# Patient Record
Sex: FEMALE | Race: WHITE | Employment: OTHER | URBAN - METROPOLITAN AREA
[De-identification: names, ages, dates, MRNs, and addresses within clinical notes are randomized per-mention and may not be internally consistent; named-entity substitution may affect disease eponyms.]

---

## 2018-10-17 ENCOUNTER — APPOINTMENT (OUTPATIENT)
Dept: GENERAL RADIOLOGY | Age: 83
DRG: 871 | End: 2018-10-17
Attending: EMERGENCY MEDICINE
Payer: MEDICARE

## 2018-10-17 ENCOUNTER — HOSPITAL ENCOUNTER (INPATIENT)
Age: 83
LOS: 3 days | Discharge: HOSPICE | DRG: 871 | End: 2018-10-20
Attending: EMERGENCY MEDICINE | Admitting: HOSPITALIST
Payer: MEDICARE

## 2018-10-17 DIAGNOSIS — N30.00 ACUTE CYSTITIS WITHOUT HEMATURIA: Primary | ICD-10-CM

## 2018-10-17 DIAGNOSIS — R65.20 SEVERE SEPSIS (HCC): ICD-10-CM

## 2018-10-17 DIAGNOSIS — G30.1 LATE ONSET ALZHEIMER'S DISEASE WITH BEHAVIORAL DISTURBANCE (HCC): ICD-10-CM

## 2018-10-17 DIAGNOSIS — A41.9 SEVERE SEPSIS (HCC): ICD-10-CM

## 2018-10-17 DIAGNOSIS — F02.818 LATE ONSET ALZHEIMER'S DISEASE WITH BEHAVIORAL DISTURBANCE (HCC): ICD-10-CM

## 2018-10-17 PROBLEM — J18.9 PNA (PNEUMONIA): Status: ACTIVE | Noted: 2018-10-17

## 2018-10-17 PROBLEM — G30.9 ALZHEIMER'S DEMENTIA (HCC): Status: ACTIVE | Noted: 2018-10-17

## 2018-10-17 PROBLEM — F02.80 ALZHEIMER'S DEMENTIA (HCC): Status: ACTIVE | Noted: 2018-10-17

## 2018-10-17 PROBLEM — F41.8 SITUATIONAL ANXIETY: Status: ACTIVE | Noted: 2018-10-17

## 2018-10-17 PROBLEM — N39.0 UTI (URINARY TRACT INFECTION): Status: ACTIVE | Noted: 2018-10-17

## 2018-10-17 PROBLEM — E78.00 HYPERCHOLESTEREMIA: Status: ACTIVE | Noted: 2018-10-17

## 2018-10-17 PROBLEM — I10 HTN (HYPERTENSION): Status: ACTIVE | Noted: 2018-10-17

## 2018-10-17 PROBLEM — E87.6 HYPOKALEMIA: Status: ACTIVE | Noted: 2018-10-17

## 2018-10-17 LAB
ALBUMIN SERPL-MCNC: 3.3 G/DL (ref 3.4–5)
ALBUMIN/GLOB SERPL: 0.7 {RATIO} (ref 0.8–1.7)
ALP SERPL-CCNC: 91 U/L (ref 45–117)
ALT SERPL-CCNC: 34 U/L (ref 13–56)
ANION GAP SERPL CALC-SCNC: 12 MMOL/L (ref 3–18)
APPEARANCE UR: ABNORMAL
AST SERPL-CCNC: 20 U/L (ref 15–37)
BACTERIA URNS QL MICRO: ABNORMAL /HPF
BASOPHILS # BLD: 0 K/UL (ref 0–0.1)
BASOPHILS NFR BLD: 0 % (ref 0–2)
BILIRUB SERPL-MCNC: 2.2 MG/DL (ref 0.2–1)
BILIRUB UR QL: NEGATIVE
BUN SERPL-MCNC: 18 MG/DL (ref 7–18)
BUN/CREAT SERPL: 17 (ref 12–20)
CALCIUM SERPL-MCNC: 9.7 MG/DL (ref 8.5–10.1)
CHLORIDE SERPL-SCNC: 101 MMOL/L (ref 100–108)
CO2 SERPL-SCNC: 26 MMOL/L (ref 21–32)
COLOR UR: ABNORMAL
CREAT SERPL-MCNC: 1.07 MG/DL (ref 0.6–1.3)
DIFFERENTIAL METHOD BLD: ABNORMAL
EOSINOPHIL # BLD: 0 K/UL (ref 0–0.4)
EOSINOPHIL NFR BLD: 0 % (ref 0–5)
EPITH CASTS URNS QL MICRO: ABNORMAL /LPF (ref 0–5)
ERYTHROCYTE [DISTWIDTH] IN BLOOD BY AUTOMATED COUNT: 13.3 % (ref 11.6–14.5)
GLOBULIN SER CALC-MCNC: 4.5 G/DL (ref 2–4)
GLUCOSE SERPL-MCNC: 156 MG/DL (ref 74–99)
GLUCOSE UR STRIP.AUTO-MCNC: NEGATIVE MG/DL
HCT VFR BLD AUTO: 42 % (ref 35–45)
HGB BLD-MCNC: 14 G/DL (ref 12–16)
HGB UR QL STRIP: ABNORMAL
KETONES UR QL STRIP.AUTO: 15 MG/DL
LACTATE SERPL-SCNC: 1.3 MMOL/L (ref 0.4–2)
LACTATE SERPL-SCNC: 4 MMOL/L (ref 0.4–2)
LEUKOCYTE ESTERASE UR QL STRIP.AUTO: ABNORMAL
LYMPHOCYTES # BLD: 1 K/UL (ref 0.9–3.6)
LYMPHOCYTES NFR BLD: 7 % (ref 21–52)
MCH RBC QN AUTO: 28.7 PG (ref 24–34)
MCHC RBC AUTO-ENTMCNC: 33.3 G/DL (ref 31–37)
MCV RBC AUTO: 86.1 FL (ref 74–97)
MONOCYTES # BLD: 1.2 K/UL (ref 0.05–1.2)
MONOCYTES NFR BLD: 8 % (ref 3–10)
MUCOUS THREADS URNS QL MICRO: ABNORMAL /LPF
NEUTS SEG # BLD: 13.6 K/UL (ref 1.8–8)
NEUTS SEG NFR BLD: 85 % (ref 40–73)
NITRITE UR QL STRIP.AUTO: POSITIVE
PH UR STRIP: 5.5 [PH] (ref 5–8)
PLATELET # BLD AUTO: 151 K/UL (ref 135–420)
PMV BLD AUTO: 10.4 FL (ref 9.2–11.8)
POTASSIUM SERPL-SCNC: 3.4 MMOL/L (ref 3.5–5.5)
PROT SERPL-MCNC: 7.8 G/DL (ref 6.4–8.2)
PROT UR STRIP-MCNC: 30 MG/DL
RBC # BLD AUTO: 4.88 M/UL (ref 4.2–5.3)
RBC #/AREA URNS HPF: ABNORMAL /HPF (ref 0–5)
SODIUM SERPL-SCNC: 139 MMOL/L (ref 136–145)
SP GR UR REFRACTOMETRY: 1.03 (ref 1–1.03)
UROBILINOGEN UR QL STRIP.AUTO: 1 EU/DL (ref 0.2–1)
WBC # BLD AUTO: 15.8 K/UL (ref 4.6–13.2)
WBC URNS QL MICRO: ABNORMAL /HPF (ref 0–5)

## 2018-10-17 PROCEDURE — 85025 COMPLETE CBC W/AUTO DIFF WBC: CPT | Performed by: EMERGENCY MEDICINE

## 2018-10-17 PROCEDURE — 83605 ASSAY OF LACTIC ACID: CPT | Performed by: HOSPITALIST

## 2018-10-17 PROCEDURE — 92526 ORAL FUNCTION THERAPY: CPT

## 2018-10-17 PROCEDURE — 71045 X-RAY EXAM CHEST 1 VIEW: CPT

## 2018-10-17 PROCEDURE — 81001 URINALYSIS AUTO W/SCOPE: CPT | Performed by: EMERGENCY MEDICINE

## 2018-10-17 PROCEDURE — 51701 INSERT BLADDER CATHETER: CPT

## 2018-10-17 PROCEDURE — 87040 BLOOD CULTURE FOR BACTERIA: CPT | Performed by: EMERGENCY MEDICINE

## 2018-10-17 PROCEDURE — 77030011943

## 2018-10-17 PROCEDURE — 74011250637 HC RX REV CODE- 250/637: Performed by: EMERGENCY MEDICINE

## 2018-10-17 PROCEDURE — 87186 SC STD MICRODIL/AGAR DIL: CPT | Performed by: EMERGENCY MEDICINE

## 2018-10-17 PROCEDURE — 84132 ASSAY OF SERUM POTASSIUM: CPT | Performed by: EMERGENCY MEDICINE

## 2018-10-17 PROCEDURE — 74011250637 HC RX REV CODE- 250/637: Performed by: HOSPITALIST

## 2018-10-17 PROCEDURE — 83605 ASSAY OF LACTIC ACID: CPT | Performed by: EMERGENCY MEDICINE

## 2018-10-17 PROCEDURE — 74011250636 HC RX REV CODE- 250/636: Performed by: EMERGENCY MEDICINE

## 2018-10-17 PROCEDURE — 94762 N-INVAS EAR/PLS OXIMTRY CONT: CPT

## 2018-10-17 PROCEDURE — 96365 THER/PROPH/DIAG IV INF INIT: CPT

## 2018-10-17 PROCEDURE — 96375 TX/PRO/DX INJ NEW DRUG ADDON: CPT

## 2018-10-17 PROCEDURE — 99285 EMERGENCY DEPT VISIT HI MDM: CPT

## 2018-10-17 PROCEDURE — 83735 ASSAY OF MAGNESIUM: CPT | Performed by: HOSPITALIST

## 2018-10-17 PROCEDURE — 74011000250 HC RX REV CODE- 250: Performed by: EMERGENCY MEDICINE

## 2018-10-17 PROCEDURE — 87077 CULTURE AEROBIC IDENTIFY: CPT | Performed by: EMERGENCY MEDICINE

## 2018-10-17 PROCEDURE — 92610 EVALUATE SWALLOWING FUNCTION: CPT

## 2018-10-17 PROCEDURE — 87086 URINE CULTURE/COLONY COUNT: CPT | Performed by: EMERGENCY MEDICINE

## 2018-10-17 PROCEDURE — 36415 COLL VENOUS BLD VENIPUNCTURE: CPT | Performed by: HOSPITALIST

## 2018-10-17 PROCEDURE — 65610000006 HC RM INTENSIVE CARE

## 2018-10-17 PROCEDURE — 74011250636 HC RX REV CODE- 250/636: Performed by: HOSPITALIST

## 2018-10-17 PROCEDURE — 93005 ELECTROCARDIOGRAM TRACING: CPT

## 2018-10-17 RX ORDER — DOCUSATE SODIUM 100 MG/1
100 CAPSULE, LIQUID FILLED ORAL 2 TIMES DAILY
Status: DISCONTINUED | OUTPATIENT
Start: 2018-10-17 | End: 2018-10-17

## 2018-10-17 RX ORDER — ONDANSETRON 2 MG/ML
4 INJECTION INTRAMUSCULAR; INTRAVENOUS
Status: DISCONTINUED | OUTPATIENT
Start: 2018-10-17 | End: 2018-10-20 | Stop reason: HOSPADM

## 2018-10-17 RX ORDER — POTASSIUM CHLORIDE 20MEQ/15ML
40 LIQUID (ML) ORAL
Status: COMPLETED | OUTPATIENT
Start: 2018-10-17 | End: 2018-10-17

## 2018-10-17 RX ORDER — LEVOFLOXACIN 5 MG/ML
750 INJECTION, SOLUTION INTRAVENOUS
Status: DISCONTINUED | OUTPATIENT
Start: 2018-10-19 | End: 2018-10-18

## 2018-10-17 RX ORDER — ACETAMINOPHEN 325 MG/1
650 TABLET ORAL
Status: DISCONTINUED | OUTPATIENT
Start: 2018-10-17 | End: 2018-10-20 | Stop reason: HOSPADM

## 2018-10-17 RX ORDER — SAME BUTANEDISULFONATE/BETAINE 400-600 MG
500 POWDER IN PACKET (EA) ORAL 2 TIMES DAILY
Status: DISCONTINUED | OUTPATIENT
Start: 2018-10-17 | End: 2018-10-20 | Stop reason: HOSPADM

## 2018-10-17 RX ORDER — ACETAMINOPHEN 500 MG
1000 TABLET ORAL ONCE
Status: COMPLETED | OUTPATIENT
Start: 2018-10-17 | End: 2018-10-17

## 2018-10-17 RX ORDER — NALOXONE HYDROCHLORIDE 0.4 MG/ML
0.4 INJECTION, SOLUTION INTRAMUSCULAR; INTRAVENOUS; SUBCUTANEOUS AS NEEDED
Status: DISCONTINUED | OUTPATIENT
Start: 2018-10-17 | End: 2018-10-20 | Stop reason: HOSPADM

## 2018-10-17 RX ORDER — SODIUM CHLORIDE 9 MG/ML
50 INJECTION, SOLUTION INTRAVENOUS CONTINUOUS
Status: DISPENSED | OUTPATIENT
Start: 2018-10-17 | End: 2018-10-19

## 2018-10-17 RX ORDER — LORAZEPAM 0.5 MG/1
0.5 TABLET ORAL
COMMUNITY
End: 2018-10-20

## 2018-10-17 RX ORDER — LEVOFLOXACIN 5 MG/ML
750 INJECTION, SOLUTION INTRAVENOUS EVERY 24 HOURS
Status: DISCONTINUED | OUTPATIENT
Start: 2018-10-17 | End: 2018-10-17

## 2018-10-17 RX ORDER — HEPARIN SODIUM 5000 [USP'U]/ML
5000 INJECTION, SOLUTION INTRAVENOUS; SUBCUTANEOUS EVERY 8 HOURS
Status: DISCONTINUED | OUTPATIENT
Start: 2018-10-17 | End: 2018-10-19

## 2018-10-17 RX ORDER — VANCOMYCIN HYDROCHLORIDE
1250 EVERY 24 HOURS
Status: DISCONTINUED | OUTPATIENT
Start: 2018-10-17 | End: 2018-10-19

## 2018-10-17 RX ORDER — ACETAMINOPHEN 325 MG/1
650 TABLET ORAL
COMMUNITY

## 2018-10-17 RX ORDER — HYDRALAZINE HYDROCHLORIDE 20 MG/ML
10 INJECTION INTRAMUSCULAR; INTRAVENOUS
Status: DISCONTINUED | OUTPATIENT
Start: 2018-10-17 | End: 2018-10-20 | Stop reason: HOSPADM

## 2018-10-17 RX ORDER — SODIUM CHLORIDE 0.9 % (FLUSH) 0.9 %
5-10 SYRINGE (ML) INJECTION AS NEEDED
Status: DISCONTINUED | OUTPATIENT
Start: 2018-10-17 | End: 2018-10-20 | Stop reason: HOSPADM

## 2018-10-17 RX ORDER — DIPHENHYDRAMINE HYDROCHLORIDE 50 MG/ML
12.5 INJECTION, SOLUTION INTRAMUSCULAR; INTRAVENOUS
Status: DISCONTINUED | OUTPATIENT
Start: 2018-10-17 | End: 2018-10-20 | Stop reason: HOSPADM

## 2018-10-17 RX ADMIN — ACETAMINOPHEN 1000 MG: 500 TABLET, FILM COATED ORAL at 13:18

## 2018-10-17 RX ADMIN — SODIUM CHLORIDE 641 ML: 900 INJECTION, SOLUTION INTRAVENOUS at 13:54

## 2018-10-17 RX ADMIN — CEFEPIME HYDROCHLORIDE 2 G: 2 INJECTION, POWDER, FOR SOLUTION INTRAVENOUS at 13:19

## 2018-10-17 RX ADMIN — POTASSIUM CHLORIDE 40 MEQ: 20 SOLUTION ORAL at 19:14

## 2018-10-17 RX ADMIN — SODIUM CHLORIDE 500 ML: 900 INJECTION, SOLUTION INTRAVENOUS at 15:49

## 2018-10-17 RX ADMIN — SODIUM CHLORIDE 1000 ML: 900 INJECTION, SOLUTION INTRAVENOUS at 13:54

## 2018-10-17 RX ADMIN — VANCOMYCIN HYDROCHLORIDE 1250 MG: 10 INJECTION, POWDER, LYOPHILIZED, FOR SOLUTION INTRAVENOUS at 16:18

## 2018-10-17 RX ADMIN — HYDRALAZINE HYDROCHLORIDE 10 MG: 20 INJECTION INTRAMUSCULAR; INTRAVENOUS at 23:11

## 2018-10-17 RX ADMIN — SODIUM CHLORIDE 125 ML/HR: 900 INJECTION, SOLUTION INTRAVENOUS at 15:48

## 2018-10-17 RX ADMIN — LEVOFLOXACIN 750 MG: 5 INJECTION, SOLUTION INTRAVENOUS at 13:26

## 2018-10-17 RX ADMIN — SODIUM CHLORIDE 125 ML/HR: 900 INJECTION, SOLUTION INTRAVENOUS at 23:07

## 2018-10-17 RX ADMIN — HEPARIN SODIUM 5000 UNITS: 5000 INJECTION INTRAVENOUS; SUBCUTANEOUS at 17:57

## 2018-10-17 RX ADMIN — HEPARIN SODIUM 5000 UNITS: 5000 INJECTION INTRAVENOUS; SUBCUTANEOUS at 23:11

## 2018-10-17 NOTE — PROGRESS NOTES
Vancomycin - Pharmacy to Dose Consult provided for this 81y.o. year old ,female for indication of Pneumonia ( HAP ). Therapy day 1 Wt Readings from Last 1 Encounters:  
10/17/18 73.9 kg (163 lb) Ht Readings from Last 1 Encounters:  
10/17/18 162.6 cm (64\") Dosing Weight:  73.9 kg Additional Antibiotics:   Cefepime 2 gm IV q8hrs, Levofloxacin 750 mg IV q24hrs. Date:  10/17/18 Lab Results Component Value Date/Time Creatinine 1.07 10/17/2018 12:50 PM  
 
creatinine clearance:  39.9 ml/min 
 
white blood cell count:  15.8 Will dose Vancomycin 1250 mg IV q24hrs. Vancomycin trough level after 3-4 doses infused. Dose calculated to approximate a therapeutic trough of 15-20 mcg/mL. Pharmacy to follow daily and will make changes to dose and/or frequency based on clinical status. Additional recommendations regarding antibiotic therapy: Will renally adjust Cefepime to 2 gm IV q24hrs and 
 
Levofloxacin to 750 mg IV q48hrs per renal dosing protocol. [id number] [facility], [facility]

## 2018-10-17 NOTE — PROGRESS NOTES
Admission Medication Reconciliation has been performed on this ED patient consisting of interview of the patient regarding their PTA Home Medication List, Allergies and PMH as well as obtaining outpatient pharmacy information. Interviewed patient's family who were a good historians as pt has advanced dementia. Patient did not provide a written list of home medications. Medications are managed by: daughter Patient's outpatient pharmacy is [facility] Smoking status is: denies Alcohol use: wine a few times a year Illicit drug use: denies Patient ABX use within the past 30 days = an ABX for UTI at the end of July Has patient received any antineoplastics in the past 30 days? na Has patient received any radiation treatments in the past 45 days? na 
 
PAML was not marked complete and did  require modification. Admission orders have not already been written. Medication Compliance Issues and/or Medication Concerns:  
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 Family states they are unaware of any pain meds she did tolerate at any previous hospitalization. [facility] reports \"itching all over\" as rxn to codeine. Family states in July her PCP took her off all her meds (HTN and cholesterol)  and stopped her namenda almost 2 yrs ago as it wasn't helping her. Updated PAML w/ info provided by family: 
Prior to Admission Medications Prescriptions Last Dose Informant Patient Reported? Taking? LORazepam (ATIVAN) 0.5 mg tablet 8/1/2018  Yes Yes Sig: Take 0.5 mg by mouth daily as needed for Anxiety (situational anxiety). acetaminophen (TYLENOL) 325 mg tablet 10/16/2018 at Unknown time  Yes Yes Sig: Take 650 mg by mouth every six (6) hours as needed for Pain. cranberry extract 450 mg tab tablet 10/16/2018 at Unknown time  Yes Yes Sig: Take 450 mg by mouth as needed (uti sx). Facility-Administered Medications: None  
 
         
 
 
Luis Beavers [facility] Clinical Pharmacist 
[phone]

## 2018-10-17 NOTE — PROGRESS NOTES
Problem: Dysphagia (Adult) Goal: *Acute Goals and Plan of Care (Insert Text) Recommendations: 
Diet: Regular/thin Meds: Per patient preference Aspiration Precautions Oral Care TID Other: MBSS next day Goals:  Patient will: 1. Tolerate PO trials with 0 s/s overt distress in 4/5 trials 2. Utilize compensatory swallow strategies/maneuvers (decrease bite/sip, size/rate, alt. liq/sol) with min cues in 4/5 trials 3. Perform oral-motor/laryngeal exercises to increase oropharyngeal swallow function with min cues 4. Complete an objective swallow study (i.e., MBSS) to assess swallow integrity, r/o aspiration, and determine of safest LRD, min A Outcome: Progressing Towards Goal 
 
Speech LAnguage Pathology bedside swallow  
evaluation & TREATMENT Patient: Addy Westbrook (81 y.o. female) Date: 10/17/2018 Primary Diagnosis: Sepsis ([facility] [address] Precautions: Aspiration PLOF: Living with family ASSESSMENT : 
Based on the objective data described below, the patient presents with minimal oropharyngeal dysphagia in the setting of dementia and PNA. A&O to self and location. Son at bedside provided pt history. Endorses regular diet at baseline. Pt observed to have wet, weak cough prior; did not appear directly correlated to PO trials. Accepted self-fed thin liquid +/- straw via small sips, puree and regular solid trials. Pt exhibited adequate bolus cohesion, manipulation and A-P transit. Further exhibited adequate swallow timing/reflex and hyolaryngeal excursion. Able to manipulate and clear with 0 clinical s/s aspiration. Pt safe for regular solid, thin liquid diet with aspiration precautions. MBSS next day to rule out silent aspiration. Patient must be supervised for all PO. Patient and son agreeable to recommendations.  D/w Dr. Mensah Shows.  
 
Skilled therapy initiated; Educated pt and son on aspiration precautions and importance of compensatory swallow techniques to decrease aspiration risk (decrease rate of intake & sip/bite size, upright @HOB for all po intake and ~30 minutes after po); son verbalized comprehension, patient requires reinforcement. Patient will benefit from skilled intervention to address the above impairments. Patients rehabilitation potential is considered to be Fair Factors which may influence rehabilitation potential include:  
[]            None noted [x]            Mental ability/status []            Medical condition []            Home/family situation and support systems [x]            Safety awareness 
[]            Pain tolerance/management []            Other: PLAN : 
Recommendations and Planned Interventions: 
Regular/thin, MBSS Frequency/Duration: Patient will be followed by speech-language pathology 1-2 times per day/4-7 days per week to address goals. Discharge Recommendations: To Be Determined SUBJECTIVE:  
Patient stated [employer]. OBJECTIVE:  
 
Past Medical History:  
Diagnosis Date  Alzheimer's dementia PCP stopped all meds 7/2018  
 HTN (hypertension) PCP stopped all meds 7/2018  Hypercholesteremia PCP stopped all meds 7/2018  Situational anxiety Past Surgical History:  
Procedure Laterality Date  HX HYSTERECTOMY Age 55 Prior Level of Function/Home Situation: Living with family Diet prior to admission: Regular/thin Current Diet:  Regular/thin Cognitive and Communication Status: 
Neurologic State: Alert Orientation Level: Oriented to person, Oriented to place, Disoriented to situation, Disoriented to time Cognition: Follows commands, Decreased attention/concentration, Memory loss Perception: Appears intact Perseveration: Perseverates during conversation Safety/Judgement: Decreased insight into deficits Oral Assessment: 
Oral Assessment Labial: No impairment Dentition: Natural;Intact Oral Hygiene: Good Lingual: No impairment Velum: No impairment Mandible: No impairment P.O. Trials: 
Patient Position: KBE 69 Vocal quality prior to P.O.: Wet Consistency Presented: Thin liquid; Solid;Puree How Presented: Successive swallows;Cup/sip; Self-fed/presented Bolus Acceptance: No impairment Bolus Formation/Control: No impairment Propulsion: No impairment Oral Residue: None Initiation of Swallow: No impairment Laryngeal Elevation: Functional 
Aspiration Signs/Symptoms: Infiltrate on chest xray;Weak cough Pharyngeal Phase Characteristics: Poor endurance Effective Modifications: Small sips and bites Cues for Modifications: Minimal 
  
Oral Phase Severity: Minimal 
Pharyngeal Phase Severity : Minimal 
GCODESwallowing:  Swallow Current Status CI= 1-19%  Swallow Goal Status CH= 0% The severity rating is based on the following outcomes: NICOLLE Noms Swallow Level 6 Clinical Judgement PAIN: 
Start of Eval/Tx: 0 End of Eval/Tx: 0 After treatment:  
[]            Patient left in no apparent distress sitting up in chair 
[x]            Patient left in no apparent distress in bed 
[x]            Call bell left within reach 
[]            Nursing notified 
[x]            Family present [x]            Caregiver present 
[]            Bed alarm activated COMMUNICATION/EDUCATION:  
[x]            Safe swallowing guidelines; compensatory techniques. [x]            Patient/family have participated as able in goal setting and plan of care. [x]            Patient/family agree to work toward stated goals and plan of care. []            Patient understands intent and goals of therapy; neutral about participation. [x]            Patient unable to participate in goal setting/plan of care; educ ongoing with interdisciplinary staff 
[]         Posted safety precautions in patient's room. Thank you for this referral. 
AURELIA Mccall Time Calculation: 20 mins Evaluation Time: 10 minutes Treatment Time: 10 minutes

## 2018-10-17 NOTE — CONSULTS
[facility]  Pulmonary, Critical Care, and Sleep Medicine    Name: Davonte Obrien MRN: [id number]   : 1935 Hospital: [facility] [place]   Date: 10/17/2018        Pulmonary Critical Care Initial Patient Consult    IMPRESSION:   · Active Problems:  ·   Sepsis ([address] (10/17/2018)  ·   ·   Alzheimer's dementia (10/17/2018)  ·     Overview: PCP stopped all meds 2018  ·   ·   HTN (hypertension) (10/17/2018)  ·     Overview: PCP stopped all meds 2018  ·   ·   Hypercholesteremia (10/17/2018)  ·     Overview: PCP stopped all meds 2018  ·   ·   Situational anxiety (10/17/2018)  ·   ·   Hypokalemia (10/17/2018)  ·   ·   PNA (pneumonia) (10/17/2018)  ·   · Large ovarian cyst   RECOMMENDATIONS:   Compensated respiratory status. Monitor for goal SPO2> 90%  Aspiration prevention bundle, head of the bed at 30' all times  Sputum culture if sample available  Continue bronchodilators, pulmonary hygiene care  Sepsis bundle per hospital protocol  Antibiotic choice: cefepime, Levofloxacin, Vancomycin  Cultures drawn and will be followed. Lactic acid - initial elevated and repeat Q6hrs till normalized. Monitor hemodynamics  Stress ulcer prophylaxis  DVT prophylaxis  AM labs. Diet per swallow evaluation  Palliative care consulted    Will defer respective systems problem management to primary and other consultant and follow patient in ICU with primary and other medical team  Further recommendations will be based on the patient's response to recommended treatment and results of the investigation ordered. Quality Care: PPI, DVT prophylaxis, HOB elevated, Infection control all reviewed and addressed. PAIN AND SEDATION: patient demented  · Nutrition: diet per swallow eval  · Prophylaxis: DVT and GI Prophylaxis reviewed.    · Restraints: none  · PT/OT eval and treat: as needed   · Lines/Tubes: lines PIV  ADVANCE DIRECTIVE: DNR/DNI  FAMILY DISCUSSION: spoke with son at bedside  Events and notes from last 25 hours reviewed. Care plan discussed with nursing      Subjective/History:   Ms. Estrlela Ruano has been seen and evaluated as Dr. Irineo Lyle requested now for pneumonia. The patient can not provide additional history due to dementia. Son provided information. Patient is a 81 y.o. female with PMHX of Alzheimer's disease, ovarian Cyst who was brought by son to the ED C/O decreased activity onset 2-3 days ago associated with fever, decreased appetite, and dark urine with odor. CXR done at [facility] showed PNA. Son denies cough, dysphagia, N/V, abd pain and chest pain. At the time of this evaluation patient Is awake, alert, follows some simple commands. Otherwise information is limited. Review of Systems:  Review of systems not obtained due to patient factors.     [x]The patient is unable to give any meaningful history or review of systems because the patient is:  []Intubated []Sedated   []Unresponsive [x]demented     [x]The patient is critically ill on      []Mechanical ventilation []Pressors   []BiPAP [x]Severe sepsis               Latest lactic acid:   Lactic acid   Date Value Ref Range Status   10/17/2018 1.3 0.4 - 2.0 MMOL/L Final   10/17/2018 4.0 (HH) 0.4 - 2.0 MMOL/L Final     Comment:     CALLED TO AND CORRECTLY REPEATED BY:  DR SAMSON ED ON 10/17/18 AT 1349 TO [facility]         Completed IVF Resuscitation (30ml/kg) - yes  IVF choice: 0.9NS    Suspected source/s of severe sepsis: pneumonia, UTI    Organ dysfunction: Yes    Antibiotics: vancomycin, Levaquin and cefepime    Completed physical exam: yes    Past Medical History:  Past Medical History:   Diagnosis Date    Alzheimer's dementia     PCP stopped all meds 7/2018    HTN (hypertension)     PCP stopped all meds 7/2018    Hypercholesteremia     PCP stopped all meds 7/2018    Situational anxiety         Past Surgical History:  Past Surgical History:   Procedure Laterality Date    HX HYSTERECTOMY      Age 55        Medications:  Prior to Admission medications Medication Sig Start Date End Date Taking? Authorizing Provider   LORazepam (ATIVAN) 0.5 mg tablet Take 0.5 mg by mouth daily as needed for Anxiety (situational anxiety). Yes Provider, Historical   cranberry extract 450 mg tab tablet Take 450 mg by mouth as needed (uti sx). Yes Provider, Historical   acetaminophen (TYLENOL) 325 mg tablet Take 650 mg by mouth every six (6) hours as needed for Pain. Yes Provider, Historical       Current Facility-Administered Medications   Medication Dose Route Frequency    0.9% sodium chloride infusion  125 mL/hr IntraVENous CONTINUOUS    heparin (porcine) injection 5,000 Units  5,000 Units SubCUTAneous Q8H    sodium chloride 0.9 % bolus infusion 500 mL  500 mL IntraVENous ONCE    Saccharomyces boulardii (FLORASTOR) capsule 500 mg  500 mg Oral BID    vancomycin (VANCOCIN) 1250 mg in  ml infusion  1,250 mg IntraVENous Q24H    Vancomycin - Pharmacokinetic Dosing  1 Each Other Rx Dosing/Monitoring    [START ON 10/19/2018] levoFLOXacin (LEVAQUIN) 750 mg in D5W IVPB  750 mg IntraVENous Q48H    [START ON 10/18/2018] cefepime (MAXIPIME) 2 g in sterile water (preservative free) 10 mL IV syringe  2 g IntraVENous Q24H       Allergy:  Allergies   Allergen Reactions    Codeine Unknown (comments)     \"made her deathly ill a very long long time ago\"        Social History:  Social History     Tobacco Use    Smoking status: Never Smoker    Smokeless tobacco: Never Used   Substance Use Topics    Alcohol use: No     Frequency: Never    Drug use: No        Family History:  Family History   Problem Relation Age of Onset    Breast Cancer Sister           Objective:   Vital Signs:    Blood pressure 140/59, pulse 84, temperature 98.5 °F (36.9 °C), resp. rate 26, height 5' 4\" (1.626 m), weight 73.9 kg (163 lb), SpO2 94 %. Body mass index is 27.98 kg/m².    O2 Device: Room air       Temp (24hrs), Av.4 °F (37.4 °C), Min:98.5 °F (36.9 °C), Max:101.1 °F (38.4 °C)       Patient Vitals for the past 8 hrs:   Temp Pulse Resp BP SpO2   10/17/18 1600  84 26 140/59 94 %   10/17/18 1554  86 22 157/74 96 %   10/17/18 1530  88 (!) 31 157/74    10/17/18 1527  88 28 (!) 123/97 97 %   10/17/18 1500  91 28 (!) 123/97    10/17/18 1439 98.5 °F (36.9 °C) 91 26 143/70 97 %   10/17/18 1430  89 25 143/70 97 %   10/17/18 1400  92 29 120/78    10/17/18 1330  96 (!) 31 155/65    10/17/18 1327  95 28 154/70 97 %   10/17/18 1308  93 (!) 32 154/70 95 %   10/17/18 1240 (!) 101.1 °F (38.4 °C) 97 29 (!) 143/102 97 %   10/17/18 1224 98.6 °F (37 °C) 99 16 146/74 97 %     Intake/Output:   Last shift:      No intake/output data recorded. Last 3 shifts: No intake/output data recorded. No intake or output data in the 24 hours ending 10/17/18 1616      Physical Exam:  General: awake, alert, demented, in no respiratory distress and acyanotic, cooperative, no distress, appears stated age, on room air  HEENT: PERRL, fundi benign, throat normal without erythema or exudate  Neck: No abnormally enlarged lymph nodes or thyroid, supple  Chest: normal  Lungs: moderate air entry, breathing normal , normal percussion bilaterally, rhonchi few R> L base, no tenderness/ rash  Heart: Regular rate and rhythm, S1S2 present or without murmur or extra heart sounds  Abdomen: protuberant, bowel sounds normoactive, tympanic, abdomen is soft without significant tenderness, masses, organomegaly or guarding, rigidity, rebound  Extremity: negative for edema, cyanosis, clubbing  Capillary refill: normal  Neuro: moves all extremities well, no involuntary movements, awake, follows some commands, dementia, exam limitation  Skin: Skin color, texture, turgor fair.  Skin dry, warm, non-diaphoretic    Data:     Recent Results (from the past 24 hour(s))   LACTIC ACID    Collection Time: 10/17/18 12:50 PM   Result Value Ref Range    Lactic acid 4.0 (HH) 0.4 - 2.0 MMOL/L   METABOLIC PANEL, COMPREHENSIVE    Collection Time: 10/17/18 12:50 PM Result Value Ref Range    Sodium 139 136 - 145 mmol/L    Potassium 3.4 (L) 3.5 - 5.5 mmol/L    Chloride 101 100 - 108 mmol/L    CO2 26 21 - 32 mmol/L    Anion gap 12 3.0 - 18 mmol/L    Glucose 156 (H) 74 - 99 mg/dL    BUN 18 7.0 - 18 MG/DL    Creatinine 1.07 0.6 - 1.3 MG/DL    BUN/Creatinine ratio 17 12 - 20      GFR est AA 60 (L) >60 ml/min/1.73m2    GFR est non-AA 49 (L) >60 ml/min/1.73m2    Calcium 9.7 8.5 - 10.1 MG/DL    Bilirubin, total 2.2 (H) 0.2 - 1.0 MG/DL    ALT (SGPT) 34 13 - 56 U/L    AST (SGOT) 20 15 - 37 U/L    Alk. phosphatase 91 45 - 117 U/L    Protein, total 7.8 6.4 - 8.2 g/dL    Albumin 3.3 (L) 3.4 - 5.0 g/dL    Globulin 4.5 (H) 2.0 - 4.0 g/dL    A-G Ratio 0.7 (L) 0.8 - 1.7     CBC WITH AUTOMATED DIFF    Collection Time: 10/17/18 12:50 PM   Result Value Ref Range    WBC 15.8 (H) 4.6 - 13.2 K/uL    RBC 4.88 4.20 - 5.30 M/uL    HGB 14.0 12.0 - 16.0 g/dL    HCT 42.0 35.0 - 45.0 %    MCV 86.1 74.0 - 97.0 FL    MCH 28.7 24.0 - 34.0 PG    MCHC 33.3 31.0 - 37.0 g/dL    RDW 13.3 11.6 - 14.5 %    PLATELET 682 798 - 477 K/uL    MPV 10.4 9.2 - 11.8 FL    NEUTROPHILS 85 (H) 40 - 73 %    LYMPHOCYTES 7 (L) 21 - 52 %    MONOCYTES 8 3 - 10 %    EOSINOPHILS 0 0 - 5 %    BASOPHILS 0 0 - 2 %    ABS. NEUTROPHILS 13.6 (H) 1.8 - 8.0 K/UL    ABS. LYMPHOCYTES 1.0 0.9 - 3.6 K/UL    ABS. MONOCYTES 1.2 0.05 - 1.2 K/UL    ABS. EOSINOPHILS 0.0 0.0 - 0.4 K/UL    ABS.  BASOPHILS 0.0 0.0 - 0.1 K/UL    DF AUTOMATED     URINALYSIS W/ RFLX MICROSCOPIC    Collection Time: 10/17/18  1:08 PM   Result Value Ref Range    Color DARK YELLOW      Appearance CLOUDY      Specific gravity 1.026 1.005 - 1.030      pH (UA) 5.5 5.0 - 8.0      Protein 30 (A) NEG mg/dL    Glucose NEGATIVE  NEG mg/dL    Ketone 15 (A) NEG mg/dL    Bilirubin NEGATIVE  NEG      Blood LARGE (A) NEG      Urobilinogen 1.0 0.2 - 1.0 EU/dL    Nitrites POSITIVE (A) NEG      Leukocyte Esterase SMALL (A) NEG     URINE MICROSCOPIC ONLY    Collection Time: 10/17/18  1:08 PM Result Value Ref Range    WBC 2 to 5 0 - 5 /hpf    RBC 4 to 10 0 - 5 /hpf    Epithelial cells 2+ 0 - 5 /lpf    Bacteria 4+ (A) NEG /hpf    Mucus 1+ (A) NEG /lpf   LACTIC ACID    Collection Time: 10/17/18  3:15 PM   Result Value Ref Range    Lactic acid 1.3 0.4 - 2.0 MMOL/L           No results for input(s): FIO2I, IFO2, HCO3I, IHCO3, HCOPOC, PCO2I, PCOPOC, IPHI, PHI, PHPOC, PO2I, PO2POC in the last 72 hours. No lab exists for component: IPOC2    All Micro Results     Procedure Component Value Units Date/Time    Mary Marques, URINE [[id number]]     Order Status:  Sent Specimen:  Urine     LEGIONELLA PNEUMOPHILA AG, URINE [[id number]]     Order Status:  Sent Specimen:  Urine     INFLUENZA A & B AG (RAPID TEST) [[id number]]     Order Status:  Sent Specimen:  Nasopharyngeal     CULTURE, BLOOD [[id number]] Collected:  10/17/18 1250    Order Status:  Completed Specimen:  Blood Updated:  10/17/18 1318    CULTURE, BLOOD [[id number]] Collected:  10/17/18 1300    Order Status:  Completed Specimen:  Blood Updated:  10/17/18 1317    CULTURE, URINE [[id number]] Collected:  10/17/18 1308    Order Status:  Completed Specimen:  Cath Urine Updated:  10/17/18 1313          Telemetry: normal sinus rhythm      Imaging:  [x]I have personally reviewed the patients chest radiographs images and report     Results from Hospital Encounter encounter on 10/17/18   XR CHEST PORT    Narrative EXAM: XR CHEST PORT    CLINICAL INDICATION/HISTORY: meets SIRS criteria   >Additional: None    COMPARISON: None. >Reference exam: None. TECHNIQUE: Portable chest.    _______________    FINDINGS:    The patient is slightly rotated to the left. SUPPORT LINES AND TUBES: None. HEART AND MEDIASTINUM: The cardiac and mediastinal contours and pulmonary  vascularity are normal. Mild atheromatous calcifications noted in the aorta. LUNGS AND PLEURAL SPACES: Streaky right basilar opacity.  Questionable subtle  blunting of left costophrenic sulcus, partially obscured due to patient  rotation. The lungs are otherwise clear. BONY THORAX AND SOFT TISSUES: Mild general demineralization. Degenerative  changes in the [facility] joints bilaterally. No fractures. _______________      Impression IMPRESSION:    1. Patchy right basilar airspace disease, suspicious for pneumonia or possibly  aspiration. 2.  Possible trace left pleural effusion or atelectasis but suboptimally  assessed given patient rotation. No results found for this or any previous visit. [x]See my orders for details    My assessment, plan of care, findings, medications, side effects etc were discussed with:  [x]nursing []PT/OT    [x]respiratory therapy [x]Faviola Johnson   []family [x]Patient     [x]Total critical care time exclusive of procedures 50 minutes with complex decision making performed and > 50% time spent in face to face evaluation.     Aleksey Monsivais MD

## 2018-10-17 NOTE — ED NOTES
Last Filed Values: 
Temp: 98.5 °F (36.9 °C) (10/17/18 1439) Pulse (Heart Rate): 84 (10/17/18 1600) Resp Rate: 26 (10/17/18 1600) O2 Sat (%): 94 % (10/17/18 1600) BP: 140/59 (10/17/18 1600) MAP (Monitor): 79 (10/17/18 1600) MAP (Calculated): 102 (10/17/18 1554) Level of Consciousness: Alert (10/17/18 1554) Lab Results Component Value Date/Time WBC 15.8 (H) 10/17/2018 12:50 PM  
 Lactic acid 1.3 10/17/2018 03:15 PM  
 Lactic acid 4.0 (HH) 10/17/2018 12:50 PM  
 
 
Repeat LA:  Time Due: completed at 1515 Blood Cultures Drawn:  yes Fluid Resuscitation:  Total needed 1641, Status completed, amount 1641 All Antibiotics Started:  yes, Dose Due 1600, vancomycin (not delivered from pharmacy yet) VS x 2 post-fluid resuscitation:   yes Vasopressor Infusion:  no NA Provider Reassessment needed and notified:  no , Due NA Additional Interventions/Comments:  NA

## 2018-10-17 NOTE — ED NOTES
Per Luz Elena RN in ICU, she will not take report on this pt due to being unable to give Tele report on a transfer pt. Luz Elena reports, she is cannot take this pt until she gives report on her transfer pt to the tele unit.

## 2018-10-17 NOTE — ED PROVIDER NOTES
EMERGENCY DEPARTMENT HISTORY AND PHYSICAL EXAM 
 
Date: 10/17/2018 Patient Name: Mendez Zapata History of Presenting Illness No chief complaint on file. History Provided By: Patient Chief Complaint: decreased activity Duration: 2-3 days ago Timing:  Gradual and Worsening Location: generalized Quality: decreased Associated Symptoms: fever, decreased appetite, and dark urine with odor. Additional History (Context):  
12:38 PM 
Mendez Zapata is a 81 y.o. female with PMHX of Alzheimer's and Ovarian Cyst who presents to the emergency department C/O decreased activity onset 2-3 days ago. Associated sxs include fever, decreased appetite, and dark urine with odor. CXR done at [facility] showed PNA. Pt daughter suspects UTI with urine odor and confusion. Pt went to [facility] in July for possible bowel obstruction and pt was treated for UTI due to age. XR done then showed an ovarian cyst. Pt is able to repeat what others say, but does not answer questions appropriately as baseline. Allergy reported to Codeine. PSHx of hysterectomy. Pt is a non smoker, a non EtOH user, and a non recreational drug user. Pt caregiver and pt denies cough, abd pain, and chest pain. Hx and ROS limited due to pt AMS. PCP: Vlad Sim MD 
 
 
 
Past History Past Medical History: 
Past Medical History:  
Diagnosis Date  Alzheimer's dementia Past Surgical History: 
Past Surgical History:  
Procedure Laterality Date  HX HYSTERECTOMY Age 55 Family History: 
Family History Problem Relation Age of Onset  Breast Cancer Sister Social History: 
Social History Tobacco Use  Smoking status: Never Smoker  Smokeless tobacco: Never Used Substance Use Topics  Alcohol use: No  
  Frequency: Never  Drug use: No  
 
 
Allergies: Allergies Allergen Reactions  Codeine Unknown (comments) Review of Systems Review of Systems Constitutional: Positive for activity change (decreased), appetite change (decreased) and fever. Respiratory: Negative for cough. Cardiovascular: Negative for chest pain. Gastrointestinal: Negative for abdominal pain. Genitourinary:  
     (+) Urine odor and dark color Psychiatric/Behavioral: Positive for confusion. All other systems reviewed and are negative. Physical Exam  
 
Vitals:  
 10/17/18 1308 10/17/18 1327 10/17/18 1330 10/17/18 1400 BP: 154/70 154/70 155/65 120/78 Pulse: 93 95 96 92 Resp: (!) 32 28 (!) 31 29 Temp:      
SpO2: 95% 97% Weight:      
Height:      
 
Physical Exam  
Nursing note and vitals reviewed. Constitutional: Elderly appearing, mild acute distress Head: Normocephalic, Atraumatic Eyes: Pupils are equal, round, and reactive to light, EOMI Neck: Supple, non-tender Cardiovascular: Regular rate and rhythm, no murmurs, rubs, or gallops Chest: Normal work of breathing and chest excursion bilaterally Lungs: Clear to ausculation bilaterally Abdomen: Soft, non tender, non distended, normoactive bowel sounds Back: No evidence of trauma or deformity Extremities: No evidence of trauma or deformity, no LE edema Skin: Warm and dry, normal cap refill Neuro: Alert and appropriate. Baseline confusion, per family at bedside. Psychiatric: Normal mood and affect Diagnostic Study Results Labs - Recent Results (from the past 12 hour(s)) LACTIC ACID Collection Time: 10/17/18 12:50 PM  
Result Value Ref Range Lactic acid 4.0 (HH) 0.4 - 2.0 MMOL/L  
METABOLIC PANEL, COMPREHENSIVE Collection Time: 10/17/18 12:50 PM  
Result Value Ref Range Sodium 139 136 - 145 mmol/L Potassium 3.4 (L) 3.5 - 5.5 mmol/L Chloride 101 100 - 108 mmol/L  
 CO2 26 21 - 32 mmol/L Anion gap 12 3.0 - 18 mmol/L Glucose 156 (H) 74 - 99 mg/dL BUN 18 7.0 - 18 MG/DL  Creatinine 1.07 0.6 - 1.3 MG/DL  
 BUN/Creatinine ratio 17 12 - 20    
 GFR est AA 60 (L) >60 ml/min/1.73m2 GFR est non-AA 49 (L) >60 ml/min/1.73m2 Calcium 9.7 8.5 - 10.1 MG/DL Bilirubin, total 2.2 (H) 0.2 - 1.0 MG/DL  
 ALT (SGPT) 34 13 - 56 U/L  
 AST (SGOT) 20 15 - 37 U/L Alk. phosphatase 91 45 - 117 U/L Protein, total 7.8 6.4 - 8.2 g/dL Albumin 3.3 (L) 3.4 - 5.0 g/dL Globulin 4.5 (H) 2.0 - 4.0 g/dL A-G Ratio 0.7 (L) 0.8 - 1.7    
CBC WITH AUTOMATED DIFF Collection Time: 10/17/18 12:50 PM  
Result Value Ref Range WBC 15.8 (H) 4.6 - 13.2 K/uL  
 RBC 4.88 4.20 - 5.30 M/uL  
 HGB 14.0 12.0 - 16.0 g/dL HCT 42.0 35.0 - 45.0 % MCV 86.1 74.0 - 97.0 FL  
 MCH 28.7 24.0 - 34.0 PG  
 MCHC 33.3 31.0 - 37.0 g/dL  
 RDW 13.3 11.6 - 14.5 % PLATELET 780 977 - 422 K/uL MPV 10.4 9.2 - 11.8 FL  
 NEUTROPHILS 85 (H) 40 - 73 % LYMPHOCYTES 7 (L) 21 - 52 % MONOCYTES 8 3 - 10 % EOSINOPHILS 0 0 - 5 % BASOPHILS 0 0 - 2 %  
 ABS. NEUTROPHILS 13.6 (H) 1.8 - 8.0 K/UL  
 ABS. LYMPHOCYTES 1.0 0.9 - 3.6 K/UL  
 ABS. MONOCYTES 1.2 0.05 - 1.2 K/UL  
 ABS. EOSINOPHILS 0.0 0.0 - 0.4 K/UL  
 ABS. BASOPHILS 0.0 0.0 - 0.1 K/UL  
 DF AUTOMATED URINALYSIS W/ RFLX MICROSCOPIC Collection Time: 10/17/18  1:08 PM  
Result Value Ref Range Color DARK YELLOW Appearance CLOUDY Specific gravity 1.026 1.005 - 1.030    
 pH (UA) 5.5 5.0 - 8.0 Protein 30 (A) NEG mg/dL Glucose NEGATIVE  NEG mg/dL Ketone 15 (A) NEG mg/dL Bilirubin NEGATIVE  NEG Blood LARGE (A) NEG Urobilinogen 1.0 0.2 - 1.0 EU/dL Nitrites POSITIVE (A) NEG Leukocyte Esterase SMALL (A) NEG URINE MICROSCOPIC ONLY Collection Time: 10/17/18  1:08 PM  
Result Value Ref Range WBC 2 to 5 0 - 5 /hpf  
 RBC 4 to 10 0 - 5 /hpf Epithelial cells 2+ 0 - 5 /lpf Bacteria 4+ (A) NEG /hpf Mucus 1+ (A) NEG /lpf Radiologic Studies -  
 
CT Results  (Last 48 hours) None CXR Results  (Last 48 hours) 10/17/18 1346  XR CHEST PORT Final result Impression:  IMPRESSION:  
   
1. Patchy right basilar airspace disease, suspicious for pneumonia or possibly  
aspiration. 2.  Possible trace left pleural effusion or atelectasis but suboptimally  
assessed given patient rotation. Narrative:  EXAM: XR CHEST PORT  
   
CLINICAL INDICATION/HISTORY: meets SIRS criteria >Additional: None COMPARISON: None. >Reference exam: None. TECHNIQUE: Portable chest.  
   
_______________ FINDINGS:  
   
The patient is slightly rotated to the left. SUPPORT LINES AND TUBES: None. HEART AND MEDIASTINUM: The cardiac and mediastinal contours and pulmonary  
vascularity are normal. Mild atheromatous calcifications noted in the aorta. LUNGS AND PLEURAL SPACES: Streaky right basilar opacity. Questionable subtle  
blunting of left costophrenic sulcus, partially obscured due to patient  
rotation. The lungs are otherwise clear. BONY THORAX AND SOFT TISSUES: Mild general demineralization. Degenerative  
changes in the [facility] joints bilaterally. No fractures. _______________ Medications given in the ED- Medications  
sodium chloride (NS) flush 5-10 mL (not administered)  
cefepime (MAXIPIME) 2 g in sterile water (preservative free) 10 mL IV syringe (2 g IntraVENous Given 10/17/18 1319) levoFLOXacin (LEVAQUIN) 750 mg in D5W IVPB (750 mg IntraVENous New Bag 10/17/18 1326)  
acetaminophen (TYLENOL) tablet 1,000 mg (1,000 mg Oral Given 10/17/18 1318)  
sodium chloride 0.9 % bolus infusion 1,000 mL (1,000 mL IntraVENous New Bag 10/17/18 1354) Followed by  
sodium chloride 0.9 % bolus infusion 641 mL (641 mL IntraVENous New Bag 10/17/18 1354) Medical Decision Making I am the first provider for this patient. I reviewed the vital signs, available nursing notes, past medical history, past surgical history, family history and social history. Vital Signs-Reviewed the patient's vital signs. Pulse Oximetry Analysis - 97% on room air Cardiac Monitor: 
Rate: 87 bpm 
Rhythm: sinus rhythm EKG interpretation: (Preliminary) 12:48 PM  
NSR at 95 bpm. QRS duration at 110 ms. EKG read by Celsa Fine MD at 12:50 PM  
 
Records Reviewed: Nursing Notes and Old Medical Records Provider Notes (Medical Decision Making): 81year old female sent from Urgent care for sepsis. Hx and exam consistent with UTI and XR concerning for pneumonia. Sepsis protocol initiated. Lactate with evidence of severe sepsis. Will discuss with hospitalist for further inpatient management. Procedures: 
Procedures ED Course:  
12:38 PM Initial assessment performed. The patients presenting problems have been discussed, and they are in agreement with the care plan formulated and outlined with them. I have encouraged them to ask questions as they arise throughout their visit. 1:49 PM With regards to administration of fluid calculation, I am using ideal body weight due to age and obesity. SEPSIS ASSESSMENT NOTE:  
2:11 PM 
Seema Sher MD has seen and assessed the patient as follows: 
 
Capillary Refill:normal/brisk Cardiopulmonary Evaluation: 
 Lung Sounds: dull/diminished Cardiac Sounds: regular Peripheral Pulses: present Skin Exam: skin unremarkable, pink, warm Visit Vitals /65 Pulse 96 Temp (!) 101.1 °F (38.4 °C) Resp (!) 31 Ht 5' 4\" (1.626 m) Wt 73.9 kg (163 lb) SpO2 97% BMI 27.98 kg/m² Written by Danielle Sanches ED Scribe, as dictated by Celsa Fine MD  
 
2:21 PM Discussed patient's history, exam, and available diagnostics results with Patricia Pierre MD, internal medicine, who agree with admission to the ICU. 
 
2:33 PM Updated on plan for admission and son informed me that she is a DNR/DNI. Diagnosis and Disposition Critical Care Time:  
I have spent 43 minutes of critical care time involved in lab review, consultations with specialist, family decision-making, and documentation. During this entire length of time I was immediately available to the patient. Critical Care: The reason for providing this level of medical care for this critically ill patient was due a critical illness that impaired one or more vital organ systems such that there was a high probability of imminent or life threatening deterioration in the patients condition. This care involved high complexity decision making to assess, manipulate, and support vital system functions, to treat this degreee vital organ system failure and to prevent further life threatening deterioration of the patients condition. Core Measures: 
For Hospitalized Patients: 
 
1. Hospitalization Decision Time: The decision to hospitalize the patient was made by Lourdes Smith MD at 1:49 PM on 10/17/2018 2. Aspirin: Aspirin was not given because the patient did not present with a stroke at the time of their Emergency Department evaluation 2:21 PM  Patient is being admitted to the hospital by Geoffrey Conner MD. The results of their tests and reasons for their admission have been discussed with them and/or available family. They convey agreement and understanding for the need to be admitted and for their admission diagnosis. CONDITIONS ON ADMISSION: 
Sepsis is present at the time of admission. Deep Vein Thrombosis is not present at the time of admission. Thrombosis is not present at the time of admission. Urinary Tract Infection is present at the time of admission. Pneumonia is present at the time of admission. MRSA is not present at the time of admission. Wound infection is not present at the time of admission. Pressure Ulcer is not present at the time of admission. CLINICAL IMPRESSION: 
 
1. Acute cystitis without hematuria 2. Severe sepsis ([address] PLAN: 
1. ADMIT to ICU 
_______________________________ Attestations: This note is prepared by Greg Monterroso and Filiberto Chin, acting as Scribe for Jennifer Huffman MD. Jennifer Huffman MD:  The scribe's documentation has been prepared under my direction and personally reviewed by me in its entirety. I confirm that the note above accurately reflects all work, treatment, procedures, and medical decision making performed by me. 
_______________________________

## 2018-10-17 NOTE — PROGRESS NOTES
Physical Therapy Screening: 
Services are indicated and therapy order is required. An [facility] screening referral was triggered for physical therapy based on results obtained during the nursing admission assessment. The patients chart was reviewed and the patient is appropriate for a skilled therapy evaluation. Please order a consult for physical therapy if you are in agreement and would like an evaluation to be completed. Thank you.  
 
Pacheco Lord, PTA

## 2018-10-17 NOTE — ED NOTES
4th attempt to call report. Accepting RN unable to take report at this time. Chris Cruger from ICU states, [facility] accepting RN is currently in the middle of transferring another pt.

## 2018-10-17 NOTE — PROGRESS NOTES
Occupational Therapy Screening: 
Services are not indicated at this time. [facility] was triggered for occupational therapy based on results obtained during the nursing admission assessment. The patients chart was reviewed and the patient is not appropriate for a skilled therapy evaluation at this time. Patient admitted w/ UTI and possible ? PNA from [facility]. Appears to require significant ADL assist at baseline d/t cognition. Pt w/ advanced Alzheimers and will need medical management for cognition to return to baseline. Thank you.  
Almita Leung, OTR/L

## 2018-10-17 NOTE — ED TRIAGE NOTES
Patient taken to [facility] for possible [facility], patient family member told to take patient to ER for possbile PNA and abnormal labs, hx alzheimer's patient unable to provide hx

## 2018-10-17 NOTE — PROGRESS NOTES
1715: pt received to unit. Head to toe assessment completed. Pt son and daughter in law at bedside. Updated on pt condition. Pt is on room air, lung sounds diminished bilaterally. Alert, but oriented to person only. Bowel sounds hyperactive. Abdominal mass noted. Pt denies pain at this time. Afebrile and vital signs stable. Call light left in reach. Will continue to monitor closely. 1930: Bedside and Verbal shift change report given to AMANDA El RN (oncoming nurse) by JOEY Talamantes (offgoing nurse). Report included the following information SBAR, Kardex, Intake/Output, MAR and Cardiac Rhythm NSR.

## 2018-10-17 NOTE — H&P
History & Physical 
Patient: Michael Kelly MRN: [id number]  CSN: [id number] YOB: 1935  Age: 81 y.o. Sex: female DOA: 10/17/2018 Primary Care Provider:  Clive Ramírez MD 
 
 
Assessment/Plan Hospital Problems  Never Reviewed Codes Class Noted POA * (Principal) Severe sepsis ([address] ICD-10-CM: A41.9, R65.20 ICD-9-CM: 038.9, 995.92  10/17/2018 Alzheimer's dementia ICD-10-CM: G30.9, F02.80 ICD-9-CM: 331.0  10/17/2018 Unknown Overview Signed 10/17/2018  3:10 PM by Bipin Gibson MD  
  PCP stopped all meds 7/2018 HTN (hypertension) ICD-10-CM: I10 
ICD-9-CM: 401.9  10/17/2018 Unknown Overview Signed 10/17/2018  3:10 PM by Bipin Gibson MD  
  PCP stopped all meds 7/2018 Hypercholesteremia ICD-10-CM: E78.00 ICD-9-CM: 272.0  10/17/2018 Unknown Overview Signed 10/17/2018  3:10 PM by Bipin Gibson MD  
  PCP stopped all meds 7/2018 Situational anxiety ICD-10-CM: F41.8 ICD-9-CM: 300.09  10/17/2018 Unknown Hypokalemia ICD-10-CM: E87.6 ICD-9-CM: 276.8  10/17/2018 Unknown PNA (pneumonia) ICD-10-CM: J18.9 ICD-9-CM: 687  10/17/2018 Unknown UTI (urinary tract infection) ICD-10-CM: N39.0 ICD-9-CM: 599.0  10/17/2018 Unknown Admit to icu Severe sepsis Due to uti and pna (hcap vs aspiration pna Septic protocol Lactic acid was 4, received iv fluid, will continue monitoring,  Septic protocol PNA-hcap vs aspiration pna Iv abx and breathing tx Speech evaluation and aspiration precaution Dementia Fall/aspiration precaution  
 
htn  
pcp stopped all medication Will give hydralazine prn  
 
hld Not on meds Hypokalemia  
k replacement AC:  I have had a discussion with  family regarding code status.  Particularly, described potential options in event of cardiac or respiratory arrest. I have explained what being full code entails, including cardiorespiratory resuscitation attempts with chest compressions, potential cariodioversion/ \" shocks\" as well as intubation. I have also explained Do not resuscitate which would mean we allow a natural death with out aggressive interventions. Dnr/I  AC 32 min DVT : heparin,  ppi proph CC: ams HPI:  
 
Addy Westbrook is a 81 y.o. female who hx of dementia, htn , hld ,ovarian cyst was sent to ER per family due to ams. She has dementia, son reported she has decreased appetite, fever and more confused. Family suspected uti and went to [facility] and was told to come to hospital for possible sepsis. She presented fever on arrival, lactic acid 4, wbc 15.8. Cxr: rt side pneumonia. She was in SNF two weeks ago. She was not a good historian and all above information came from family and er report. Son and daughter in law were at the bedside. Visit Vitals /81 Pulse 88 Temp 98.5 °F (36.9 °C) Resp 25 Ht 5' 4\" (1.626 m) Wt 73.9 kg (163 lb) SpO2 93% BMI 27.98 kg/m² O2 Device: Room air Past Medical History:  
Diagnosis Date  Alzheimer's dementia PCP stopped all meds 7/2018  
 HTN (hypertension) PCP stopped all meds 7/2018  Hypercholesteremia PCP stopped all meds 7/2018  Situational anxiety Past Surgical History:  
Procedure Laterality Date  HX HYSTERECTOMY Age 55 Family History Problem Relation Age of Onset  Breast Cancer Sister Social History Socioeconomic History  Marital status:  Spouse name: Not on file  Number of children: Not on file  Years of education: Not on file  Highest education level: Not on file Social Needs  Financial resource strain: Not on file  Food insecurity - worry: Not on file  Food insecurity - inability: Not on file  Transportation needs - medical: Not on file  Transportation needs - non-medical: Not on file Occupational History  Not on file Tobacco Use  Smoking status: Never Smoker  Smokeless tobacco: Never Used Substance and Sexual Activity  Alcohol use: No  
  Frequency: Never  Drug use: No  
 Sexual activity: Not on file Other Topics Concern  Not on file Social History Narrative  Not on file Prior to Admission medications Medication Sig Start Date End Date Taking? Authorizing Provider LORazepam (ATIVAN) 0.5 mg tablet Take 0.5 mg by mouth daily as needed for Anxiety (situational anxiety). Yes Provider, Historical  
cranberry extract 450 mg tab tablet Take 450 mg by mouth as needed (uti sx). Yes Provider, Historical  
acetaminophen (TYLENOL) 325 mg tablet Take 650 mg by mouth every six (6) hours as needed for Pain. Yes Provider, Historical  
 
 
Allergies Allergen Reactions  Codeine Unknown (comments) \"made her deathly ill a very long long time ago\" Review of Systems Unable to obtain due to dementia Physical Exam:  
 
Physical Exam: 
Visit Vitals /81 Pulse 88 Temp 98.5 °F (36.9 °C) Resp 25 Ht 5' 4\" (1.626 m) Wt 73.9 kg (163 lb) SpO2 93% BMI 27.98 kg/m² O2 Device: Room air Temp (24hrs), Av.4 °F (37.4 °C), Min:98.5 °F (36.9 °C), Max:101.1 °F (38.4 °C) No intake/output data recorded. No intake/output data recorded. General:  Awake, some cooperative, no distress. Head:  Normocephalic, without obvious abnormality, atraumatic. Eyes:  Conjunctivae/corneas clear, sclera anicteric, PERRL, EOMs intact. Nose: Nares normal. No drainage or sinus tenderness. Throat: Lips, mucosa, and tongue normal. .  
Neck: Supple, symmetrical, trachea midline, no adenopathy. Lungs:   Rales of rt side Heart:  Regular rate and rhythm, S1, S2 normal, + murmur, click, rub or gallop. Abdomen: Soft, non-tender. Bowel sounds normal. + masses,  No organomegaly. Extremities: Extremities normal, atraumatic, no cyanosis or edema. Pulses: 2+ and symmetric all extremities. Skin: Skin color-pink, texture, turgor normal. No rashes or lesions. Capillary refill normal   
Neurologic: CNII-XII intact. No focal motor or sensory deficit. Labs Reviewed: 
 
 
 
Procedures/imaging: see electronic medical records for all procedures/Xrays and details which were not copied into this note but were reviewed prior to creation of Plan Kang Soni MD, Internal Medicine CC: Lluvia Iqbal MD

## 2018-10-17 NOTE — ED NOTES
Report called to [facility] ICU RN . Patient is resting comfortably in locked  stretcher side rails up and call bell in reach with family at bedside.

## 2018-10-18 ENCOUNTER — APPOINTMENT (OUTPATIENT)
Dept: GENERAL RADIOLOGY | Age: 83
DRG: 871 | End: 2018-10-18
Attending: HOSPITALIST
Payer: MEDICARE

## 2018-10-18 PROBLEM — R19.00 PELVIC MASS: Status: ACTIVE | Noted: 2018-10-18

## 2018-10-18 LAB
ANION GAP SERPL CALC-SCNC: 11 MMOL/L (ref 3–18)
BASOPHILS # BLD: 0 K/UL (ref 0–0.1)
BASOPHILS NFR BLD: 0 % (ref 0–2)
BUN SERPL-MCNC: 11 MG/DL (ref 7–18)
BUN/CREAT SERPL: 16 (ref 12–20)
CALCIUM SERPL-MCNC: 8.6 MG/DL (ref 8.5–10.1)
CHLORIDE SERPL-SCNC: 109 MMOL/L (ref 100–108)
CO2 SERPL-SCNC: 20 MMOL/L (ref 21–32)
CREAT SERPL-MCNC: 0.67 MG/DL (ref 0.6–1.3)
DIFFERENTIAL METHOD BLD: ABNORMAL
EOSINOPHIL # BLD: 0 K/UL (ref 0–0.4)
EOSINOPHIL NFR BLD: 0 % (ref 0–5)
ERYTHROCYTE [DISTWIDTH] IN BLOOD BY AUTOMATED COUNT: 13.4 % (ref 11.6–14.5)
FLUAV AG NPH QL IA: NEGATIVE
FLUBV AG NOSE QL IA: NEGATIVE
GLUCOSE BLD STRIP.AUTO-MCNC: 93 MG/DL (ref 70–110)
GLUCOSE SERPL-MCNC: 103 MG/DL (ref 74–99)
HCT VFR BLD AUTO: 37.3 % (ref 35–45)
HGB BLD-MCNC: 12.4 G/DL (ref 12–16)
LYMPHOCYTES # BLD: 1.2 K/UL (ref 0.9–3.6)
LYMPHOCYTES NFR BLD: 10 % (ref 21–52)
MAGNESIUM SERPL-MCNC: 1.5 MG/DL (ref 1.6–2.6)
MAGNESIUM SERPL-MCNC: 2 MG/DL (ref 1.6–2.6)
MAGNESIUM SERPL-MCNC: 2.1 MG/DL (ref 1.6–2.6)
MCH RBC QN AUTO: 28.3 PG (ref 24–34)
MCHC RBC AUTO-ENTMCNC: 33.2 G/DL (ref 31–37)
MCV RBC AUTO: 85.2 FL (ref 74–97)
MONOCYTES # BLD: 1 K/UL (ref 0.05–1.2)
MONOCYTES NFR BLD: 8 % (ref 3–10)
NEUTS SEG # BLD: 9.6 K/UL (ref 1.8–8)
NEUTS SEG NFR BLD: 82 % (ref 40–73)
PLATELET # BLD AUTO: 143 K/UL (ref 135–420)
PMV BLD AUTO: 10.6 FL (ref 9.2–11.8)
POTASSIUM SERPL-SCNC: 3.1 MMOL/L (ref 3.5–5.5)
POTASSIUM SERPL-SCNC: 3.4 MMOL/L (ref 3.5–5.5)
POTASSIUM SERPL-SCNC: 3.4 MMOL/L (ref 3.5–5.5)
POTASSIUM SERPL-SCNC: 3.8 MMOL/L (ref 3.5–5.5)
RBC # BLD AUTO: 4.38 M/UL (ref 4.2–5.3)
SODIUM SERPL-SCNC: 140 MMOL/L (ref 136–145)
WBC # BLD AUTO: 11.8 K/UL (ref 4.6–13.2)

## 2018-10-18 PROCEDURE — 74011000255 HC RX REV CODE- 255: Performed by: HOSPITALIST

## 2018-10-18 PROCEDURE — 84132 ASSAY OF SERUM POTASSIUM: CPT | Performed by: HOSPITALIST

## 2018-10-18 PROCEDURE — 74011250636 HC RX REV CODE- 250/636: Performed by: EMERGENCY MEDICINE

## 2018-10-18 PROCEDURE — 77030027138 HC INCENT SPIROMETER -A

## 2018-10-18 PROCEDURE — 87804 INFLUENZA ASSAY W/OPTIC: CPT | Performed by: INTERNAL MEDICINE

## 2018-10-18 PROCEDURE — 74011250636 HC RX REV CODE- 250/636: Performed by: HOSPITALIST

## 2018-10-18 PROCEDURE — 92526 ORAL FUNCTION THERAPY: CPT

## 2018-10-18 PROCEDURE — 65660000000 HC RM CCU STEPDOWN

## 2018-10-18 PROCEDURE — 74230 X-RAY XM SWLNG FUNCJ C+: CPT

## 2018-10-18 PROCEDURE — 74011000250 HC RX REV CODE- 250: Performed by: EMERGENCY MEDICINE

## 2018-10-18 PROCEDURE — 36415 COLL VENOUS BLD VENIPUNCTURE: CPT | Performed by: HOSPITALIST

## 2018-10-18 PROCEDURE — 92611 MOTION FLUOROSCOPY/SWALLOW: CPT

## 2018-10-18 PROCEDURE — 82962 GLUCOSE BLOOD TEST: CPT

## 2018-10-18 PROCEDURE — 97535 SELF CARE MNGMENT TRAINING: CPT

## 2018-10-18 PROCEDURE — 85025 COMPLETE CBC W/AUTO DIFF WBC: CPT | Performed by: HOSPITALIST

## 2018-10-18 PROCEDURE — 83735 ASSAY OF MAGNESIUM: CPT | Performed by: HOSPITALIST

## 2018-10-18 PROCEDURE — 97167 OT EVAL HIGH COMPLEX 60 MIN: CPT

## 2018-10-18 PROCEDURE — 97162 PT EVAL MOD COMPLEX 30 MIN: CPT

## 2018-10-18 PROCEDURE — 76450000000

## 2018-10-18 PROCEDURE — 74011250637 HC RX REV CODE- 250/637: Performed by: HOSPITALIST

## 2018-10-18 PROCEDURE — 97530 THERAPEUTIC ACTIVITIES: CPT

## 2018-10-18 RX ORDER — POTASSIUM CHLORIDE 20 MEQ/1
20 TABLET, EXTENDED RELEASE ORAL
Status: COMPLETED | OUTPATIENT
Start: 2018-10-18 | End: 2018-10-18

## 2018-10-18 RX ORDER — POTASSIUM CHLORIDE 7.45 MG/ML
10 INJECTION INTRAVENOUS
Status: COMPLETED | OUTPATIENT
Start: 2018-10-18 | End: 2018-10-18

## 2018-10-18 RX ORDER — CLONAZEPAM 0.5 MG/1
0.25 TABLET ORAL
Status: DISCONTINUED | OUTPATIENT
Start: 2018-10-18 | End: 2018-10-20 | Stop reason: HOSPADM

## 2018-10-18 RX ORDER — MAGNESIUM SULFATE HEPTAHYDRATE 40 MG/ML
2 INJECTION, SOLUTION INTRAVENOUS ONCE
Status: COMPLETED | OUTPATIENT
Start: 2018-10-18 | End: 2018-10-18

## 2018-10-18 RX ORDER — LEVOFLOXACIN 5 MG/ML
750 INJECTION, SOLUTION INTRAVENOUS EVERY 24 HOURS
Status: DISCONTINUED | OUTPATIENT
Start: 2018-10-18 | End: 2018-10-19

## 2018-10-18 RX ORDER — MAGNESIUM SULFATE HEPTAHYDRATE 40 MG/ML
INJECTION, SOLUTION INTRAVENOUS
Status: DISPENSED
Start: 2018-10-18 | End: 2018-10-18

## 2018-10-18 RX ADMIN — POTASSIUM CHLORIDE 10 MEQ: 10 INJECTION, SOLUTION INTRAVENOUS at 01:52

## 2018-10-18 RX ADMIN — BARIUM SULFATE 700 MG: 700 TABLET ORAL at 11:32

## 2018-10-18 RX ADMIN — CEFEPIME HYDROCHLORIDE 2 G: 2 INJECTION, POWDER, FOR SOLUTION INTRAVENOUS at 11:35

## 2018-10-18 RX ADMIN — POTASSIUM CHLORIDE 10 MEQ: 10 INJECTION, SOLUTION INTRAVENOUS at 05:26

## 2018-10-18 RX ADMIN — HYDRALAZINE HYDROCHLORIDE 10 MG: 20 INJECTION INTRAMUSCULAR; INTRAVENOUS at 06:34

## 2018-10-18 RX ADMIN — VANCOMYCIN HYDROCHLORIDE 1250 MG: 10 INJECTION, POWDER, LYOPHILIZED, FOR SOLUTION INTRAVENOUS at 17:12

## 2018-10-18 RX ADMIN — HEPARIN SODIUM 5000 UNITS: 5000 INJECTION INTRAVENOUS; SUBCUTANEOUS at 23:25

## 2018-10-18 RX ADMIN — Medication 500 MG: at 09:01

## 2018-10-18 RX ADMIN — POTASSIUM CHLORIDE 10 MEQ: 10 INJECTION, SOLUTION INTRAVENOUS at 07:12

## 2018-10-18 RX ADMIN — BARIUM SULFATE 30 ML: 400 PASTE ORAL at 11:34

## 2018-10-18 RX ADMIN — POTASSIUM CHLORIDE 10 MEQ: 10 INJECTION, SOLUTION INTRAVENOUS at 03:12

## 2018-10-18 RX ADMIN — MAGNESIUM SULFATE HEPTAHYDRATE 2 G: 40 INJECTION, SOLUTION INTRAVENOUS at 01:11

## 2018-10-18 RX ADMIN — HEPARIN SODIUM 5000 UNITS: 5000 INJECTION INTRAVENOUS; SUBCUTANEOUS at 08:00

## 2018-10-18 RX ADMIN — Medication 10 ML: at 06:36

## 2018-10-18 RX ADMIN — HEPARIN SODIUM 5000 UNITS: 5000 INJECTION INTRAVENOUS; SUBCUTANEOUS at 18:05

## 2018-10-18 RX ADMIN — BARIUM SULFATE 40 G: 960 POWDER, FOR SUSPENSION ORAL at 11:33

## 2018-10-18 RX ADMIN — POTASSIUM CHLORIDE 10 MEQ: 10 INJECTION, SOLUTION INTRAVENOUS at 04:18

## 2018-10-18 RX ADMIN — Medication 500 MG: at 21:40

## 2018-10-18 RX ADMIN — POTASSIUM CHLORIDE 20 MEQ: 20 TABLET, EXTENDED RELEASE ORAL at 18:05

## 2018-10-18 RX ADMIN — HYDRALAZINE HYDROCHLORIDE 10 MG: 20 INJECTION INTRAMUSCULAR; INTRAVENOUS at 13:34

## 2018-10-18 RX ADMIN — LEVOFLOXACIN 750 MG: 5 INJECTION, SOLUTION INTRAVENOUS at 14:49

## 2018-10-18 RX ADMIN — SODIUM CHLORIDE 125 ML/HR: 900 INJECTION, SOLUTION INTRAVENOUS at 06:37

## 2018-10-18 RX ADMIN — POTASSIUM CHLORIDE 10 MEQ: 10 INJECTION, SOLUTION INTRAVENOUS at 06:26

## 2018-10-18 NOTE — PROGRESS NOTES
Bedside shift change report given to Lalitha Tafoya RN  (oncoming nurse) by Anthony Arteaga (offgoing nurse). Report included the following information SBAR, Kardex, Intake/Output, MAR, Accordion, Recent Results, Med Rec Status and Cardiac Rhythm Sinus Tach. Assumed care of patient at this time.

## 2018-10-18 NOTE — PROGRESS NOTES
Reason for Admission:  C/o generalized weakness,abnormal labs RRAT Score: 13 Do you (patient/family) have any concerns for transition/discharge? TBD Plan for utilizing home health:  TBD Likelihood of readmission? no         
Transition of Care Plan:   Chart reviewed per ED note pt was at [facility] due to generalized weakness and was told to come to ED for abnormal labs and possible pneumonia pt admitted for severe sepsis ,cm will attend IDR's and discuss d/c planning. cm met with pt and son at bedside,pt remained very quiet and calm during conversation with son, pt does have alzheimer's lives at home with son and daughter,doesn't use any dme's for ambulation but does have rolling walker and cane if needed at home, pt also has personal care takers 20/hrs week to assist with care, family open to home health services if recommended cm provided list for review.

## 2018-10-18 NOTE — PROGRESS NOTES
Problem: Dysphagia (Adult) Goal: *Acute Goals and Plan of Care (Insert Text) Recommendations: 
Diet: Regular/thin Meds: Per patient preference Aspiration Precautions Oral Care TID Other: MBSS next day Goals:  Patient will: 1. Tolerate PO trials with 0 s/s overt distress in 4/5 trials 2. Utilize compensatory swallow strategies/maneuvers (decrease bite/sip, size/rate, alt. liq/sol) with min cues in 4/5 trials 3. Perform oral-motor/laryngeal exercises to increase oropharyngeal swallow function with min cues 4. Complete an objective swallow study (i.e., MBSS) to assess swallow integrity, r/o aspiration, and determine of safest LRD, min A (met 10/18/18). Outcome: Progressing Towards Goal 
Speech-Language Pathology Modified barium swallow Study and therapy Patient: Elmyra Buerger (37 y.o. female) Date: 10/18/2018 Primary Diagnosis: Sepsis ([address] Precautions: Aspiration, Falls ASSESSMENT : 
Patient presents with minimal oropharyngeal dysphagia c/b minimally increased a/p transit time and delayed swallow initiation with mixed and solid textures resulting in minimal flash penetration into laryngeal vestibule with mixed textures. Penetration is expelled quickly. Recommend continue with current diet recommendation and continue supervision with meals. ST f/u to continue to assess overt s/sx aspiration and ensure diet tolerance as well as provide exercise per therapy plan. Patient will benefit from skilled intervention to address the above impairments. Patients rehabilitation potential is considered to be Fair Factors which may influence rehabilitation potential include:  
[]              None noted [x]              Mental ability/status [x]              Medical condition [x]              Home/family situation and support systems [x]              Safety awareness []              Pain tolerance/management 
[]              Other: PLAN : 
Recommendations and Planned Interventions: As above. Frequency/Duration: Patient will be followed by speech-language pathology 1-2 times per day/4-7 days per week to address goals. Discharge Recommendations: To Be Determined SUBJECTIVE:  
Patient stated Yes, that's fine. Ok. OBJECTIVE:  
 
Past Medical History:  
Diagnosis Date  Alzheimer's dementia PCP stopped all meds 7/2018  
 HTN (hypertension) PCP stopped all meds 7/2018  Hypercholesteremia PCP stopped all meds 7/2018  Situational anxiety Past Surgical History:  
Procedure Laterality Date  HX HYSTERECTOMY Age 55 Prior Level of Function/Home Situation:  
Home Situation Home Environment: Private residence # Steps to Enter: 4 One/Two Story Residence: Two story, live on 1st floor Living Alone: No 
Support Systems: Family member(s), Home care staff Patient Expects to be Discharged to[de-identified] Private residence Current DME Used/Available at Home: None Diet prior to admission: Regular Current Diet:  Regular Barriers to Learning/Limitations: yes;  sensory deficits-vision/hearing/speech and altered mental status (i.e.Sedation, Confusion) Compensate with: visual, verbal, tactile, kinesthetic cues/model Film Views: Lateral 
Patient Position: Upright in [facility] chair Trial 1:  
Consistency Presented: Mechanical soft;Mixed consistency;Pill/Tablet;Puree; Solid; Thin liquid How Presented: Self-fed/presented;SLP-fed/presented;Spoon;Straw Bolus Acceptance: No impairment Bolus Formation/Control: No impairment:    
Propulsion: No impairment Oral Residue: Lingual  
Initiation of Swallow: Triggered at base of tongue Timing: Pooling 1-5 sec(with mixed and solid textures) Penetration: Flash/transient(with mixed x1) Aspiration/Timing: No evidence of aspiration Pharyngeal Clearance: No residue Attempted Modifications: Alternate liquids/solids;Small sips and bites; Spoon;Straw Effective Modifications:  Alternate liquids/solids;Straw;Spoon;Small sips and bites Cues for Modifications: Minimal-moderate Decreased Tongue Base Retraction?: Yes(With mixed and solid textures) Laryngeal Elevation: WFL (within functional limits) Aspiration/Penetration Score: 2 (Penetration/No residue-Contrast enters the airway penetrates, remains above the folds/cords, and is cleared) Pharyngeal Symmetry: Not assessed Pharyngeal-Esophageal Segment: No impairment Pharyngeal Dysfunction: Decreased strength Oral Phase Severity: Minimal 
Pharyngeal Phase Severity: Minimal 
COMMUNICATION/EDUCATION:  
[x]  The patients plan of care including recommendations, planned interventions, and recommended diet changes were discussed with: Registered Nurse.   
[]  Posted safety precautions in patient's room. []  Patient/family have participated as able in goal setting and plan of care. []  Patient/family agree to work toward stated goals and plan of care. []  Patient understands intent and goals of therapy, but is neutral about his/her participation. []  Patient is unable to participate in goal setting and plan of care. Thank you for this referral. 
GILMAR Gupta.Ed, [id number] [facility] [place] Time Calculation: 28 mins

## 2018-10-18 NOTE — PROGRESS NOTES
Pulmonary Specialists Pulmonary, Critical Care, and Sleep Medicine Name: Damon Ledesma MRN: [id number] : 1935 Hospital: [facility] [place] Date: 10/18/2018 Pulmonary Critical Care Patient F/Up IMPRESSION:  
Principal Problem: 
  Severe sepsis ([address] (10/17/2018) Active Problems: 
  Alzheimer's dementia (10/17/2018) Overview: PCP stopped all meds 2018 HTN (hypertension) (10/17/2018) Overview: PCP stopped all meds 2018 Hypercholesteremia (10/17/2018) Overview: PCP stopped all meds 2018 Situational anxiety (10/17/2018) Hypokalemia (10/17/2018) PNA (pneumonia) (10/17/2018) UTI (urinary tract infection) (10/17/2018) · Large ovarian cyst  
RECOMMENDATIONS:  
Compensated respiratory status, on room air. Monitor for goal SPO2> 90% Aspiration prevention bundle, head of the bed at 30' all times Patient not able to provide sputum culture and incontinent of urine so unable to provide UR Legionella or Pneumococcus Ags Bronchodilators PRN, pulmonary hygiene care Sepsis bundle per hospital protocol Fluid: decrease NS at 50 cc/hr till started on PO diet Antibiotic choice: cefepime, Levofloxacin, Vancomycin Cultures drawn and will be followed. Lactic acid - initial elevated and repeat normalized. Stable hemodynamics Stress ulcer and DVT prophylaxis AM labs. Diet per swallow evaluation Palliative care consulted Transfer to medical floor Will defer respective systems problem management to primary and other consultant and follow patient with primary and other medical team 
Further recommendations will be based on the patient's response to recommended treatment and results of the investigation ordered. Quality Care: PPI, DVT prophylaxis, HOB elevated, Infection control all reviewed and addressed. PAIN AND SEDATION: patient demented · Nutrition: diet per swallow eval 
· Prophylaxis: DVT and GI Prophylaxis reviewed. · Restraints: none 
· PT/OT eval and treat: as needed · Lines/Tubes: lines PIV ADVANCE DIRECTIVE: DNR/DNI 
FAMILY DISCUSSION: spoke with son at bedside Events and notes from last 24 hours reviewed. Care plan discussed with nursing Subjective/History: 
Patient is a 81 y.o. female with PMHX of Alzheimer's disease, ovarian Cyst who was brought by son to the ED C/O decreased activity onset 2-3 days ago associated with fever, decreased appetite, and dark urine with odor. CXR done at [facility] showed PNA. Son denies cough, dysphagia, N/V, abd pain and chest pain. At the time of this evaluation patient Is awake, alert, follows some simple commands. Otherwise information is limited. 10/18/18 Patient remained on room air and hemodynamically stable No events overnight. Afebrile Patient looks improved; more alert, answering in full sentences Son feels patient is back to her baseline MBS pending, remained on IVF and NPO Review of Systems: 
Review of systems not obtained due to patient factors. [x]The patient is unable to give any meaningful history or review of systems because the patient is: 
[]Intubated []Sedated  
[]Unresponsive [x]demented Latest lactic acid:  
Lactic acid Date Value Ref Range Status 10/17/2018 1.3 0.4 - 2.0 MMOL/L Final  
10/17/2018 4.0 (HH) 0.4 - 2.0 MMOL/L Final  
  Comment:  
  CALLED TO AND CORRECTLY REPEATED BY: 
DR SAMSON ED ON 10/17/18 AT 1349 TO [facility] Past Medical History: 
Past Medical History:  
Diagnosis Date  Alzheimer's dementia PCP stopped all meds 7/2018  
 HTN (hypertension) PCP stopped all meds 7/2018  Hypercholesteremia PCP stopped all meds 7/2018  Situational anxiety Past Surgical History: 
Past Surgical History:  
Procedure Laterality Date  HX HYSTERECTOMY Age 55 Medications: 
Prior to Admission medications Medication Sig Start Date End Date Taking? Authorizing Provider LORazepam (ATIVAN) 0.5 mg tablet Take 0.5 mg by mouth daily as needed for Anxiety (situational anxiety). Yes Provider, Historical  
cranberry extract 450 mg tab tablet Take 450 mg by mouth as needed (uti sx). Yes Provider, Historical  
acetaminophen (TYLENOL) 325 mg tablet Take 650 mg by mouth every six (6) hours as needed for Pain. Yes Provider, Historical  
 
 
Current Facility-Administered Medications Medication Dose Route Frequency  magnesium sulfate 2 g/50 ml 2 gram/50 mL (4 %) IVPB premix pgbk      
 0.9% sodium chloride infusion  50 mL/hr IntraVENous CONTINUOUS  
 heparin (porcine) injection 5,000 Units  5,000 Units SubCUTAneous Q8H  
 Saccharomyces boulardii (FLORASTOR) capsule 500 mg  500 mg Oral BID  vancomycin (VANCOCIN) 1250 mg in  ml infusion  1,250 mg IntraVENous Q24H  Vancomycin - Pharmacokinetic Dosing  1 Each Other Rx Dosing/Monitoring  [START ON 10/19/2018] levoFLOXacin (LEVAQUIN) 750 mg in D5W IVPB  750 mg IntraVENous Q48H  cefepime (MAXIPIME) 2 g in sterile water (preservative free) 10 mL IV syringe  2 g IntraVENous Q24H Allergy: Allergies Allergen Reactions  Codeine Unknown (comments) \"made her deathly ill a very long long time ago\" Social History: 
Social History Tobacco Use  Smoking status: Never Smoker  Smokeless tobacco: Never Used Substance Use Topics  Alcohol use: No  
  Frequency: Never  Drug use: No  
  
 
Family History: 
Family History Problem Relation Age of Onset  Breast Cancer Sister Objective: 
Vital Signs:   
Blood pressure 158/85, pulse (!) 106, temperature 97.6 °F (36.4 °C), resp. rate 24, height 5' 4\" (1.626 m), weight 77.1 kg (169 lb 15.6 oz), SpO2 93 %. Body mass index is 29.18 kg/m². O2 Device: Room air Temp (24hrs), Av.5 °F (36.9 °C), Min:97.6 °F (36.4 °C), Max:101.1 °F (38.4 °C) Intake/Output:  
Last shift:      10/18 0701 - 10/18 1900 In: 295.8 [I.V.:295.8] Out: - Last 3 shifts: 10/16 1901 - 10/18 0700 In: 1927.1 [I.V.:1927.1] Out: - Intake/Output Summary (Last 24 hours) at 10/18/2018 4896 Last data filed at 10/18/2018 7686 Gross per 24 hour Intake 2222.92 ml Output  Net 2222.92 ml Physical Exam: 
General: awake, alert, demented, in no respiratory distress, cooperative, no distress, appears stated age, on room air HEENT: PERRL, fundi benign, throat normal without erythema or exudate Neck: No abnormally enlarged lymph nodes or thyroid, supple Chest: normal 
Lungs: moderate air entry, normal percussion bilaterally, few rhonchi R > L base, no tenderness/ rash Heart: Regular rate and rhythm, S1S2 present or without murmur or extra heart sounds Abdomen: protuberant, bowel sounds normoactive, tympanic, soft, no tenderness, masses, organomegaly, guarding, rigidity, or rebound Extremity: negative for edema, cyanosis, clubbing Neuro: moves all extremities well, no involuntary movements, awake, follows all commands, dementia, exam limitation Skin: Skin color, texture, turgor fair. Skin dry, warm, non-diaphoretic Data:  
 
Recent Results (from the past 24 hour(s)) EKG, 12 LEAD, INITIAL Collection Time: 10/17/18 12:48 PM  
Result Value Ref Range Ventricular Rate 95 BPM  
 Atrial Rate 95 BPM  
 P-R Interval 156 ms QRS Duration 110 ms  
 Q-T Interval 350 ms QTC Calculation (Bezet) 439 ms Calculated P Axis 70 degrees Calculated R Axis 4 degrees Calculated T Axis 13 degrees Diagnosis Normal sinus rhythm Inferior infarct , age undetermined Abnormal ECG No previous ECGs available CULTURE, BLOOD Collection Time: 10/17/18 12:50 PM  
Result Value Ref Range Special Requests: NO SPECIAL REQUESTS Culture result: NO GROWTH AFTER 18 HOURS    
LACTIC ACID Collection Time: 10/17/18 12:50 PM  
Result Value Ref Range  Lactic acid 4.0 (HH) 0.4 - 2.0 MMOL/L  
METABOLIC PANEL, COMPREHENSIVE  
 Collection Time: 10/17/18 12:50 PM  
Result Value Ref Range Sodium 139 136 - 145 mmol/L Potassium 3.4 (L) 3.5 - 5.5 mmol/L Chloride 101 100 - 108 mmol/L  
 CO2 26 21 - 32 mmol/L Anion gap 12 3.0 - 18 mmol/L Glucose 156 (H) 74 - 99 mg/dL BUN 18 7.0 - 18 MG/DL Creatinine 1.07 0.6 - 1.3 MG/DL  
 BUN/Creatinine ratio 17 12 - 20 GFR est AA 60 (L) >60 ml/min/1.73m2 GFR est non-AA 49 (L) >60 ml/min/1.73m2 Calcium 9.7 8.5 - 10.1 MG/DL Bilirubin, total 2.2 (H) 0.2 - 1.0 MG/DL  
 ALT (SGPT) 34 13 - 56 U/L  
 AST (SGOT) 20 15 - 37 U/L Alk. phosphatase 91 45 - 117 U/L Protein, total 7.8 6.4 - 8.2 g/dL Albumin 3.3 (L) 3.4 - 5.0 g/dL Globulin 4.5 (H) 2.0 - 4.0 g/dL A-G Ratio 0.7 (L) 0.8 - 1.7    
CBC WITH AUTOMATED DIFF Collection Time: 10/17/18 12:50 PM  
Result Value Ref Range WBC 15.8 (H) 4.6 - 13.2 K/uL  
 RBC 4.88 4.20 - 5.30 M/uL  
 HGB 14.0 12.0 - 16.0 g/dL HCT 42.0 35.0 - 45.0 % MCV 86.1 74.0 - 97.0 FL  
 MCH 28.7 24.0 - 34.0 PG  
 MCHC 33.3 31.0 - 37.0 g/dL  
 RDW 13.3 11.6 - 14.5 % PLATELET 474 771 - 786 K/uL MPV 10.4 9.2 - 11.8 FL  
 NEUTROPHILS 85 (H) 40 - 73 % LYMPHOCYTES 7 (L) 21 - 52 % MONOCYTES 8 3 - 10 % EOSINOPHILS 0 0 - 5 % BASOPHILS 0 0 - 2 %  
 ABS. NEUTROPHILS 13.6 (H) 1.8 - 8.0 K/UL  
 ABS. LYMPHOCYTES 1.0 0.9 - 3.6 K/UL  
 ABS. MONOCYTES 1.2 0.05 - 1.2 K/UL  
 ABS. EOSINOPHILS 0.0 0.0 - 0.4 K/UL  
 ABS. BASOPHILS 0.0 0.0 - 0.1 K/UL  
 DF AUTOMATED    
CULTURE, BLOOD Collection Time: 10/17/18  1:00 PM  
Result Value Ref Range Special Requests: NO SPECIAL REQUESTS Culture result: NO GROWTH AFTER 18 HOURS    
URINALYSIS W/ RFLX MICROSCOPIC Collection Time: 10/17/18  1:08 PM  
Result Value Ref Range Color DARK YELLOW Appearance CLOUDY Specific gravity 1.026 1.005 - 1.030    
 pH (UA) 5.5 5.0 - 8.0 Protein 30 (A) NEG mg/dL Glucose NEGATIVE  NEG mg/dL Ketone 15 (A) NEG mg/dL Bilirubin NEGATIVE  NEG Blood LARGE (A) NEG Urobilinogen 1.0 0.2 - 1.0 EU/dL Nitrites POSITIVE (A) NEG Leukocyte Esterase SMALL (A) NEG URINE MICROSCOPIC ONLY Collection Time: 10/17/18  1:08 PM  
Result Value Ref Range WBC 2 to 5 0 - 5 /hpf  
 RBC 4 to 10 0 - 5 /hpf Epithelial cells 2+ 0 - 5 /lpf Bacteria 4+ (A) NEG /hpf Mucus 1+ (A) NEG /lpf LACTIC ACID Collection Time: 10/17/18  3:15 PM  
Result Value Ref Range Lactic acid 1.3 0.4 - 2.0 MMOL/L  
POTASSIUM Collection Time: 10/17/18 11:40 PM  
Result Value Ref Range Potassium 3.1 (L) 3.5 - 5.5 mmol/L MAGNESIUM Collection Time: 10/17/18 11:40 PM  
Result Value Ref Range Magnesium 1.5 (L) 1.6 - 2.6 mg/dL METABOLIC PANEL, BASIC Collection Time: 10/18/18  4:25 AM  
Result Value Ref Range Sodium 140 136 - 145 mmol/L Potassium 3.4 (L) 3.5 - 5.5 mmol/L Chloride 109 (H) 100 - 108 mmol/L  
 CO2 20 (L) 21 - 32 mmol/L Anion gap 11 3.0 - 18 mmol/L Glucose 103 (H) 74 - 99 mg/dL BUN 11 7.0 - 18 MG/DL Creatinine 0.67 0.6 - 1.3 MG/DL  
 BUN/Creatinine ratio 16 12 - 20 GFR est AA >60 >60 ml/min/1.73m2 GFR est non-AA >60 >60 ml/min/1.73m2 Calcium 8.6 8.5 - 10.1 MG/DL MAGNESIUM Collection Time: 10/18/18  4:25 AM  
Result Value Ref Range Magnesium 2.1 1.6 - 2.6 mg/dL CBC WITH AUTOMATED DIFF Collection Time: 10/18/18  4:25 AM  
Result Value Ref Range WBC 11.8 4.6 - 13.2 K/uL  
 RBC 4.38 4.20 - 5.30 M/uL  
 HGB 12.4 12.0 - 16.0 g/dL HCT 37.3 35.0 - 45.0 % MCV 85.2 74.0 - 97.0 FL  
 MCH 28.3 24.0 - 34.0 PG  
 MCHC 33.2 31.0 - 37.0 g/dL  
 RDW 13.4 11.6 - 14.5 % PLATELET 275 794 - 372 K/uL MPV 10.6 9.2 - 11.8 FL  
 NEUTROPHILS 82 (H) 40 - 73 % LYMPHOCYTES 10 (L) 21 - 52 % MONOCYTES 8 3 - 10 % EOSINOPHILS 0 0 - 5 % BASOPHILS 0 0 - 2 %  
 ABS. NEUTROPHILS 9.6 (H) 1.8 - 8.0 K/UL  
 ABS. LYMPHOCYTES 1.2 0.9 - 3.6 K/UL  
 ABS. MONOCYTES 1.0 0.05 - 1.2 K/UL ABS. EOSINOPHILS 0.0 0.0 - 0.4 K/UL  
 ABS. BASOPHILS 0.0 0.0 - 0.1 K/UL  
 DF AUTOMATED No results for input(s): FIO2I, IFO2, HCO3I, IHCO3, HCOPOC, PCO2I, PCOPOC, IPHI, PHI, PHPOC, PO2I, PO2POC in the last 72 hours. No lab exists for component: IPOC2 All Micro Results Procedure Component Value Units Date/Time INFLUENZA A & B AG (RAPID TEST) [[id number]] Order Status:  Sent Specimen:  Nasopharyngeal   
 CULTURE, BLOOD [[id number]] Collected:  10/17/18 1250 Order Status:  Completed Specimen:  Blood Updated:  10/18/18 0734 Special Requests: NO SPECIAL REQUESTS Culture result: NO GROWTH AFTER 18 HOURS     
 CULTURE, BLOOD [[id number]] Collected:  10/17/18 1300 Order Status:  Completed Specimen:  Blood Updated:  10/18/18 0734 Special Requests: NO SPECIAL REQUESTS Culture result: NO GROWTH AFTER 18 HOURS INFLUENZA A & B AG (RAPID TEST) [[id number]] Collected:  10/17/18 2016 Order Status:  Canceled Specimen:  Nasopharyngeal Updated:  10/17/18 2021 CULTURE, URINE [[id number]] Collected:  10/17/18 1308 Order Status:  Completed Specimen:  Cath Urine Updated:  10/17/18 1952 [place], URINE [[id number]] Order Status:  Sent Specimen:  Urine LEGIONELLA PNEUMOPHILA AG, URINE [[id number]] Order Status:  Sent Specimen:  Urine Telemetry: normal sinus rhythm Imaging: 
[x]I have personally reviewed the patients chest radiographs images and report Results from Hospital Encounter encounter on 10/17/18 XR CHEST PORT Narrative EXAM: XR CHEST PORT 
 
CLINICAL INDICATION/HISTORY: meets SIRS criteria >Additional: None COMPARISON: None. >Reference exam: None. TECHNIQUE: Portable chest. 
 
_______________ FINDINGS: 
 
The patient is slightly rotated to the left. SUPPORT LINES AND TUBES: None. HEART AND MEDIASTINUM: The cardiac and mediastinal contours and pulmonary vascularity are normal. Mild atheromatous calcifications noted in the aorta. LUNGS AND PLEURAL SPACES: Streaky right basilar opacity. Questionable subtle 
blunting of left costophrenic sulcus, partially obscured due to patient 
rotation. The lungs are otherwise clear. BONY THORAX AND SOFT TISSUES: Mild general demineralization. Degenerative 
changes in the [facility] joints bilaterally. No fractures. _______________ Impression IMPRESSION: 
 
1. Patchy right basilar airspace disease, suspicious for pneumonia or possibly 
aspiration. 2.  Possible trace left pleural effusion or atelectasis but suboptimally 
assessed given patient rotation. No results found for this or any previous visit. [x]See my orders for details My assessment, plan of care, findings, medications, side effects etc were discussed with: 
[x]nursing []PT/OT   
[]respiratory therapy []   
[x]family [x]Patient [x]Total critical care time exclusive of procedures 32 minutes with complex decision making performed and > 50% time spent in face to face evaluation.  
 
Juda Aschoff, MD

## 2018-10-18 NOTE — PROGRESS NOTES
TRANSFER - OUT REPORT: 
 
Verbal report given to Nashuamargaux Squires  on Bernadette Abel  being transferred to Telemetry(unit) for routine progression of care Report consisted of patients Situation, Background, Assessment and  
Recommendations(SBAR). Information from the following report(s) SBAR, Kardex, ED Summary, Intake/Output, MAR, Accordion, Recent Results, Med Rec Status, Cardiac Rhythm NSR and Alarm Parameters  was reviewed with the receiving nurse. Lines:  
Peripheral IV 10/17/18 Right Hand (Active) Site Assessment Clean, dry, & intact 10/18/2018  6:00 PM  
Phlebitis Assessment 0 10/18/2018  6:00 PM  
Infiltration Assessment 0 10/18/2018  6:00 PM  
Dressing Status Clean, dry, & intact 10/18/2018  6:00 PM  
Dressing Type Tape;Transparent 10/18/2018  6:00 PM  
Hub Color/Line Status Flushed;Patent 10/18/2018  6:00 PM  
Alcohol Cap Used Yes 10/18/2018  6:00 PM  
  
 
Opportunity for questions and clarification was provided. Patient flacc scoore 0, no s/s of distress. Call bell in reach, family at bedside.

## 2018-10-18 NOTE — PROGRESS NOTES
0700 Received bedside shift report from AMANDA Murphy RN. Report included SBAR, Cardiac Rhythm. Pt awake and alert with baseline noted confusion. Son at bedside verbalized , Fadi Shankar seems more like herself today. \" 
0800 Shift assessment complete. Please refer to flowsheet. [address] Visit Vitals /74 Pulse 91 Temp 97.6 °F (36.4 °C) Resp (!) 35 Ht 5' 4\" (1.626 m) Wt 77.1 kg (169 lb 15.6 oz) SpO2 95% BMI 29.18 kg/m² 1100 Off unit for MBS.  
1200 Returned to unit. Family at bedside. 1330 Prn hydralazine for SBP>160 
1400 Pt remains incontinent of urine and stool. PT/OT Pt up to bedside commode with two person assist.  
Visit Vitals /82 Pulse (!) 106 Temp 97.6 °F (36.4 °C) Resp (!) 33 Ht 5' 4\" (1.626 m) Wt 77.1 kg (169 lb 15.6 oz) SpO2 96% BMI 29.18 kg/m² 14:58 Eduction provided for incentive spirometer. Pt unable to follow instructions for proper use.

## 2018-10-18 NOTE — ROUTINE PROCESS
1930: Bedside and Verbal shift change report received from Radames Emerson RN. Report included the following information SBAR, Intake/Output, MAR, Recent Results, Cardiac Rhythm NSR, Alarm Parameters  and Procedure Verification. 2000: Assessment complete, see flow sheets. 2100: Swallow evaluation complete, pt failed. Placed NPO until barium swallow tomorrow. Pt O2 low 90s, upper 80's. Attempt to place NC, pt refusing. HOB elevated. Will monitor. 2215: Orders placed to recheck potassium and magnesium per electrolyte replacement protocol. 2315: BP elevated, hydarlazine 10 mg IV given, see MAR. 
 
0000: Reassessment, no changes. 0215: Incontinent care provided. During chang, pt pulled IV on left arm by accident. Catheter tip intact. 0400: Reassessment, no changes. 1477: pt son at the bedside. 0710:Bedside and Verbal shift change report given to Sergo Sommer RN (oncoming nurse) by Farheen Spencer RN 
 (offgoing nurse). Report included the following information SBAR.

## 2018-10-18 NOTE — PROGRESS NOTES
Problem: Self Care Deficits Care Plan (Adult) Goal: *Acute Goals and Plan of Care (Insert Text) Occupational Therapy Goals Initiated 10/18/2018 within 7 day(s). 1.  Patient will perform toilet transfer Jelani x 2 for increased independence in ADLs. 2.  Patient will perform simple grooming with moderate assistance for increased independence in ADLs. 3.  Patient will perform upper extremity therapeutic exercise/activities with  moderate assistance  for 15 minutes. Outcome: Resolved/Met Date Met: 10/18/18 Occupational Therapy EVALUATION/discharge Patient: Mendez Zapata (35 y.o. female) Date: 10/18/2018 Primary Diagnosis: Sepsis ([facility] [address] Precautions: OVARIAN mass, Fall, Skin, Aspiration ASSESSMENT AND RECOMMENDATIONS: 
Based on the objective data described below, the patient presents with ability to perform ADL tasks at baseline level. Patient pleasantly confused w/ echolalia to questions. DIL at bedside and reports pt wax and wanes at home, but requires extensive ADL assist as well as 2 person HHA for walking. Noted pt to have increased posterior lean w/ 1 person in front. Once OT moved to side, pt w/ improved sitting balance. Pt responded well with tactile cues. Applied gait belt high and responded well during transfer from [facility]. Pt holds washcloths in B hands to assist w/ anxiety. Pt assisted back to bed and pt's  came in with pt smiling and throwing washcloth to him. DIL reports pt near baseline for ADLs and mobility and appears aware of progression of dementia. . Pt with no further OT/ADL concerns at this time. Education: Family instructed on body mechanics for safety with DIL verbalizing understanding at this time. Discharge Recommendations: Home Health vs Hospice Further Equipment Recommendations for Discharge: To Be Determined (TBD) at next level of care SUBJECTIVE:  
Patient stated Go to the bathroom.  (echolalia of question) OBJECTIVE DATA SUMMARY:  
 
 Past Medical History:  
Diagnosis Date  Alzheimer's dementia PCP stopped all meds 7/2018  
 HTN (hypertension) PCP stopped all meds 7/2018  Hypercholesteremia PCP stopped all meds 7/2018  Situational anxiety Past Surgical History:  
Procedure Laterality Date  HX HYSTERECTOMY Age 55 Barriers to Learning/Limitations: yes;  cognitive, physical and altered mental status (i.e.Sedation, Confusion) Compensate with: visual, verbal, tactile, kinesthetic cues/model Prior Level of Function/Home Situation: supportive family assists at all times; has a baby gate to keep her in living room (which is bedroom) to allow pt to be supervised Home Situation Home Environment: Private residence # Steps to Enter: 4 One/Two Story Residence: Two story, live on 1st floor Living Alone: No 
Support Systems: Family member(s), Home care staff Patient Expects to be Discharged to[de-identified] Private residence Current DME Used/Available at Home: None, Transfer bench, Walker(doesn't use AD for mobility; 2 person on each side) Tub or Shower Type: Tub/Shower combination 
[x]     Right hand dominant   []     Left hand dominant Cognitive/Behavioral Status: 
Neurologic State: Alert;Confused Orientation Level: Disoriented to place; Disoriented to situation;Disoriented to time;Oriented to person Cognition: Decreased attention/concentration;Decreased command following; Impaired decision making; Impulsive;Memory loss;Poor safety awareness Safety/Judgement: Decreased awareness of need for assistance;Decreased awareness of need for safety;Decreased insight into deficits; Decreased awareness of environment Skin: appears grossly intact Edema: No significant edema noted Vision/Perceptual:   
 limited visual scanning d/t cognition Coordination: 
Coordination: Generally decreased, functional 
Fine Motor Skills-Upper: Left Intact; Right Intact Gross Motor Skills-Upper: Left Intact; Right Intact Balance: Sitting: Intact Standing: Impaired; With support(posterior lean) Strength: 
Strength: Generally decreased, functional 
Tone & Sensation: 
Tone: Abnormal 
Sensation: Intact Range of Motion: 
AROM: Generally decreased, functional 
PROM: Generally decreased, functional 
Functional Mobility and Transfers for ADLs: 
Bed Mobility: 
Rolling: Moderate assistance;Maximum assistance; Additional time Supine to Sit: Moderate assistance Sit to Supine: Maximum assistance Scooting: Contact guard assistance Transfers: 
Sit to Stand: Contact guard assistance;Minimum assistance; Additional time Bed to Chair: Minimum assistance; Moderate assistance ADL Assessment: 
Oral Facial Hygiene/Grooming: Maximum assistance Bathing: Total assistance Upper Body Dressing: Maximum assistance Lower Body Dressing: Total assistance Toileting: Total assistance ADL Intervention: 
Grooming Washing Hands: Maximum assistance(wipes) Upper Body Dressing Assistance Hospital Gown: Maximum assistance Lower Body Dressing Assistance Underpants: Total assistance (dependent) Toileting Toileting Assistance: Maximum assistance; Total assistance(dependent) Bladder Hygiene: Total assistance (dependent) Bowel Hygiene: Total assistance (dependent) Clothing Management: Total assistance (dependent) Cues: Tactile cues provided;Physical assistance for pants down;Physical assistance for pants up Adaptive Equipment: (BSC) Cognitive Retraining Orientation Retraining: Reorienting;Situation Problem Solving: Inductive reason; Identifying the task; Identifying the problem;General alternative solution;Deductive reason Executive Functions: Executing cognitive plans Organizing/Sequencing: Breaking task down;Prioritizing Attention to Task: Distractibility; Single task Maintains Attention For (Time): (~5-10 seconds) Safety/Judgement: Decreased awareness of need for assistance;Decreased awareness of need for safety;Decreased insight into deficits; Decreased awareness of environment Cues: Tactile cues provided;Verbal cues provided Pain: 
Pre-treatment: 0/10 Post-treatment: 0/10 Activity Tolerance:  
Patient able to stand <1 minute(s). Patient able to complete ADLs with frequent rest breaks. Patient limited by cognition/posterior lean/strength/balance/pain. Patient unsteady Please refer to the flowsheet for vital signs taken during this treatment. After treatment:  
[]  Patient left in no apparent distress sitting up in chair 
[x]  Patient left in no apparent distress in bed 
[x]  Call bell left within reach [x]  Nursing notified 
[x]  Caregiver present/CNA/VIRGINIE Telles &  
[x]  Bed alarm activated COMMUNICATION/EDUCATION:  
Communication/Collaboration: 
[x]      Home safety education was provided and the patient/caregiver indicated understanding. [x]      Patient/family have participated as able and agree with findings and recommendations. []      Patient is unable to participate in plan of care at this time. Thank you for this referral. 
Almita Leung, OTR/L Time Calculation: 25 mins G-Codes (GP) Self Care  Current  CL= 60-79%  Goal  CL= 60-79%  D/C  CL= 60-79% The severity rating is based on the professional judgement & direct observation of Level of Assistance required for Functional Mobility and ADLs. Eval Complexity: History: HIGH Complexity : Extensive review of history including physical, cognitive and psychosocial history ; Examination: HIGH Complexity : 5 or more performance deficits relating to physical, cognitive , or psychosocial skils that result in activity limitations and / or participation restrictions; Decision Making:HIGH Complexity : Patient presents with comorbidities that affect occupational performance.  Signifigant modification of tasks or assistance (eg, physical or verbal) with assessment (s) is necessary to enable patient to complete evaluation

## 2018-10-18 NOTE — PROGRESS NOTES
Problem: Urinary Tract Infection - Adult Goal: *Absence of infection signs and symptoms Outcome: Not Progressing Towards Goal 
Attempt to get urine specimen, pt incontinent. Vaginal area very reddened with white discharge when wiping, huy care complete.

## 2018-10-18 NOTE — PROGRESS NOTES
Pharmacy Dosing Services:  Levofloxacin Indication:   UTI Previous Regimen Levofloxacin 750 mg IV q48hrs Serum Creatinine Lab Results Component Value Date/Time Creatinine 0.67 10/18/2018 04:25 AM  
  
Creatinine Clearance Estimated Creatinine Clearance: 65.1 mL/min (based on SCr of 0.67 mg/dL). BUN Lab Results Component Value Date/Time BUN 11 10/18/2018 04:25 AM  
   
 
Dose administration notes:   Changed to Levofloxacin 750 mg IV q24hrs per renal dosing protocol 
and improved renal function. Plan:  Continue to monitor Tonye Course. Laverne Mendoza [phone]

## 2018-10-18 NOTE — ROUTINE PROCESS
TRANSFER - IN REPORT: 
 
Verbal report received from JENARO Tafoya (name) on Bernadette Roman  being received from ICU (unit) for routine progression of care Report consisted of patients Situation, Background, Assessment and  
Recommendations(SBAR). Information from the following report(s) SBAR, Kardex, Intake/Output, MAR and Recent Results was reviewed with the receiving nurse. Opportunity for questions and clarification was provided. Assessment completed upon patients arrival to unit and care assumed. Urine sample and Flu swab needed to be obtained.

## 2018-10-18 NOTE — PROGRESS NOTES
Hospitalist Progress Note-critical care note Patient: Rafael Caldwell MRN: [id number]  CSN: [id number] YOB: 1935  Age: 81 y.o. Sex: female DOA: 10/17/2018 LOS:  LOS: 1 day Chief complaint: sepsis, dementia, htn , pna, uti. Assessment/Plan Hospital Problems  Never Reviewed Codes Class Noted POA * (Principal) Severe sepsis ([address] ICD-10-CM: A41.9, R65.20 ICD-9-CM: 038.9, 995.92  10/17/2018 Alzheimer's dementia ICD-10-CM: G30.9, F02.80 ICD-9-CM: 331.0  10/17/2018 Unknown Overview Signed 10/17/2018  3:10 PM by Tasia Campoverde MD  
  PCP stopped all meds 7/2018 HTN (hypertension) ICD-10-CM: I10 
ICD-9-CM: 401.9  10/17/2018 Unknown Overview Signed 10/17/2018  3:10 PM by Tasia Campoverde MD  
  PCP stopped all meds 7/2018 Hypercholesteremia ICD-10-CM: E78.00 ICD-9-CM: 272.0  10/17/2018 Unknown Overview Signed 10/17/2018  3:10 PM by Tasia Campoverde MD  
  PCP stopped all meds 7/2018 Situational anxiety ICD-10-CM: F41.8 ICD-9-CM: 300.09  10/17/2018 Unknown Hypokalemia ICD-10-CM: E87.6 ICD-9-CM: 276.8  10/17/2018 Unknown PNA (pneumonia) ICD-10-CM: J18.9 ICD-9-CM: 634  10/17/2018 Unknown UTI (urinary tract infection) ICD-10-CM: N39.0 ICD-9-CM: 599.0  10/17/2018 Unknown Severe sepsis Resolving Due to uti and pna (hcap vs aspiration pna Septic protocol Urine cx; G- daniel,  
bcx : negative  
  
  
PNA-hcap vs aspiration pna Iv abx and breathing tx Speech on board and aspiration precaution  
  
 uti: urine cx 100,000 COLONIES/mL GRAM NEGATIVE RODS Will continue cefepime till cx final results Dementia Fall/aspiration precaution Will have barium swallowing test today  
  
htn  
pcp stopped all medication Will give hydralazine prn  
  
hld Not on meds  
  
Hypokalemia  
k replacement Anxiety  
klonopin prn Subjective: feel better Son was at the bedside She is more alert and follow command, will  Have pt/ot , will transfer out-of [facility] if continue doing good Disposition :tbd, Review of systems: 
 
General: No fevers or chills. Cardiovascular: No chest pain or pressure. No palpitations. Pulmonary: No shortness of breath. Gastrointestinal: No nausea, vomiting. Vital signs/Intake and Output: 
Visit Vitals /74 Pulse 91 Temp 97.6 °F (36.4 °C) Resp (!) 35 Ht 5' 4\" (1.626 m) Wt 77.1 kg (169 lb 15.6 oz) SpO2 95% BMI 29.18 kg/m² Current Shift:  10/18 0701 - 10/18 1900 In: 295.8 [I.V.:295.8] Out: 2 Last three shifts:  10/16 1901 - 10/18 0700 In: 1927.1 [I.V.:1927.1] Out: - Physical Exam: 
General: WD, WN. Alert, cooperative, no acute distress   
HEENT: NC, Atraumatic. PERRLA, anicteric sclerae. Lungs: Rales -no wheezing Heart:  Regular  rhythm,  No murmur, No Rubs, No Gallops Abdomen: Soft, distended , palpable mass , Non tender.  +Bowel sounds, Extremities: No c/c/e Psych:   anxious , no agitated. Neurologic:  No acute neurological deficit. Labs: Results:  
   
Chemistry Recent Labs 10/18/18 
0425 10/17/18 
2340 10/17/18 
1250 *  --  156*   --  139  
K 3.4* 3.1* 3.4*  
*  --  101 CO2 20*  --  26 BUN 11 --  18  
CREA 0.67  --  1.07  
CA 8.6  --  9.7 AGAP 11  --  12  
BUCR 16  --  17  
AP  --   --  91  
TP  --   --  7.8 ALB  --   --  3.3*  
GLOB  --   --  4.5* AGRAT  --   --  0.7* CBC w/Diff Recent Labs 10/18/18 
0425 10/17/18 
1250 WBC 11.8 15.8*  
RBC 4.38 4.88  
HGB 12.4 14.0  
HCT 37.3 42.0  151 GRANS 82* 85* LYMPH 10* 7* EOS 0 0 Cardiac Enzymes No results for input(s): CPK, CKND1, EKATERINA in the last 72 hours. No lab exists for component: Emperatriz Avery Coagulation No results for input(s): PTP, INR, APTT in the last 72 hours. No lab exists for component: INREXT Lipid Panel No results found for: CHOL, CHOLPOCT, CHOLX, [address], CHOLV, [id number], HDL, LDL, LDLC, DLDLP, [id number], VLDLC, VLDL, TGLX, TRIGL, TRIGP, TGLPOCT, CHHD, CHHDX  
BNP No results for input(s): BNPP in the last 72 hours. Liver Enzymes Recent Labs 10/17/18 
1250 TP 7.8 ALB 3.3* AP 91 SGOT 20 Thyroid Studies No results found for: T4, T3U, TSH, TSHEXT Procedures/imaging: see electronic medical records for all procedures/Xrays and details which were not copied into this note but were reviewed prior to creation of Plan Jenn De Leon MD

## 2018-10-18 NOTE — PROGRESS NOTES
Problem: Pneumonia: Discharge Outcomes Goal: *Tolerating diet Outcome: Not Progressing Towards Goal 
Pt made NPO due to failing swallow evaluation, barium swallow scheduled.

## 2018-10-18 NOTE — DIABETES MGMT
GLYCEMIC CONTROL & NUTRITION: 
 
 
-Discussed in rounds and chart reviewed - BG currently within target range -  No glycemic control needs identified at this time. Recent Glucose Results:  
Lab Results Component Value Date/Time   (H) 10/18/2018 04:25 AM

## 2018-10-18 NOTE — PROGRESS NOTES
conducted an initial consultation and Spiritual Assessment for Vilma Del Rio, who is a 81 y.o.,female. Najma visit with patient's son and daughter-in-law at the bedside. Patient was out of the room. Patient has advanced dementia. She lives at home with her son and his wife. They have good support from their Taoist, [facility] and other family members. Patient has another son who live out of state but comes to visit a few times a year. The reason the Patient came to the hospital is:  
Patient Active Problem List  
 Diagnosis Date Noted  Pelvic mass 10/18/2018  Severe sepsis ([facility] [address] 10/17/2018  Alzheimer's dementia 10/17/2018  
 HTN (hypertension) 10/17/2018  Hypercholesteremia 10/17/2018  Situational anxiety 10/17/2018  Hypokalemia 10/17/2018  PNA (pneumonia) 10/17/2018  UTI (urinary tract infection) 10/17/2018 The  provided the following Interventions: 
Initiated a relationship of care and support. Listened empathically. Provided information about Spiritual Care Services. Offered assurance of continued prayers on patients behalf. Chart reviewed. The following outcomes were achieved: Son shared information about their medical narrative, spiritual journey and beliefs.  confirmed Patient's Gnosticist Affiliation. Son expressed gratitude for Spiritual Care visit. Patient was reviewed in ICU Interdisciplinary Rounds. Assessment: 
There are no significant spiritual or Baptist issues which require further intervention at this time. Patient does not have any Baptist or cultural needs that will affect patients preferences in health care. Plan: 
Chaplains will continue to follow and will provide pastoral care as needed or requested.  recommends bedside caregivers page  on duty if patient shows signs of acute spiritual or emotional distress. [address] [facility] MStephenieDiv. Board Certified [employer] [phone] - Office

## 2018-10-19 ENCOUNTER — APPOINTMENT (OUTPATIENT)
Dept: GENERAL RADIOLOGY | Age: 83
DRG: 871 | End: 2018-10-19
Attending: INTERNAL MEDICINE
Payer: MEDICARE

## 2018-10-19 ENCOUNTER — HOSPICE ADMISSION (OUTPATIENT)
Dept: HOSPICE | Facility: HOSPICE | Age: 83
End: 2018-10-19

## 2018-10-19 LAB
ANION GAP SERPL CALC-SCNC: 14 MMOL/L (ref 3–18)
BACTERIA SPEC CULT: ABNORMAL
BASOPHILS # BLD: 0 K/UL (ref 0–0.1)
BASOPHILS NFR BLD: 0 % (ref 0–2)
BUN SERPL-MCNC: 10 MG/DL (ref 7–18)
BUN/CREAT SERPL: 15 (ref 12–20)
CALCIUM SERPL-MCNC: 8.9 MG/DL (ref 8.5–10.1)
CHLORIDE SERPL-SCNC: 106 MMOL/L (ref 100–108)
CO2 SERPL-SCNC: 20 MMOL/L (ref 21–32)
CREAT SERPL-MCNC: 0.67 MG/DL (ref 0.6–1.3)
DIFFERENTIAL METHOD BLD: ABNORMAL
EOSINOPHIL # BLD: 0.1 K/UL (ref 0–0.4)
EOSINOPHIL NFR BLD: 1 % (ref 0–5)
ERYTHROCYTE [DISTWIDTH] IN BLOOD BY AUTOMATED COUNT: 13.7 % (ref 11.6–14.5)
GLUCOSE BLD STRIP.AUTO-MCNC: 100 MG/DL (ref 70–110)
GLUCOSE BLD STRIP.AUTO-MCNC: 159 MG/DL (ref 70–110)
GLUCOSE SERPL-MCNC: 98 MG/DL (ref 74–99)
HCT VFR BLD AUTO: 37.4 % (ref 35–45)
HGB BLD-MCNC: 12.5 G/DL (ref 12–16)
LYMPHOCYTES # BLD: 0.7 K/UL (ref 0.9–3.6)
LYMPHOCYTES NFR BLD: 8 % (ref 21–52)
MAGNESIUM SERPL-MCNC: 1.7 MG/DL (ref 1.6–2.6)
MCH RBC QN AUTO: 28.3 PG (ref 24–34)
MCHC RBC AUTO-ENTMCNC: 33.4 G/DL (ref 31–37)
MCV RBC AUTO: 84.6 FL (ref 74–97)
MONOCYTES # BLD: 0.7 K/UL (ref 0.05–1.2)
MONOCYTES NFR BLD: 8 % (ref 3–10)
NEUTS SEG # BLD: 7.3 K/UL (ref 1.8–8)
NEUTS SEG NFR BLD: 83 % (ref 40–73)
PLATELET # BLD AUTO: 163 K/UL (ref 135–420)
PMV BLD AUTO: 10.7 FL (ref 9.2–11.8)
POTASSIUM SERPL-SCNC: 3.1 MMOL/L (ref 3.5–5.5)
RBC # BLD AUTO: 4.42 M/UL (ref 4.2–5.3)
SERVICE CMNT-IMP: ABNORMAL
SODIUM SERPL-SCNC: 140 MMOL/L (ref 136–145)
WBC # BLD AUTO: 8.9 K/UL (ref 4.6–13.2)

## 2018-10-19 PROCEDURE — 80048 BASIC METABOLIC PNL TOTAL CA: CPT | Performed by: HOSPITALIST

## 2018-10-19 PROCEDURE — 74011250636 HC RX REV CODE- 250/636: Performed by: INTERNAL MEDICINE

## 2018-10-19 PROCEDURE — 97116 GAIT TRAINING THERAPY: CPT

## 2018-10-19 PROCEDURE — 83735 ASSAY OF MAGNESIUM: CPT | Performed by: HOSPITALIST

## 2018-10-19 PROCEDURE — 65660000000 HC RM CCU STEPDOWN

## 2018-10-19 PROCEDURE — 92526 ORAL FUNCTION THERAPY: CPT

## 2018-10-19 PROCEDURE — 36415 COLL VENOUS BLD VENIPUNCTURE: CPT | Performed by: HOSPITALIST

## 2018-10-19 PROCEDURE — 74011250637 HC RX REV CODE- 250/637: Performed by: HOSPITALIST

## 2018-10-19 PROCEDURE — 82962 GLUCOSE BLOOD TEST: CPT

## 2018-10-19 PROCEDURE — 74011250637 HC RX REV CODE- 250/637: Performed by: INTERNAL MEDICINE

## 2018-10-19 PROCEDURE — 74011250636 HC RX REV CODE- 250/636: Performed by: HOSPITALIST

## 2018-10-19 PROCEDURE — 71045 X-RAY EXAM CHEST 1 VIEW: CPT

## 2018-10-19 PROCEDURE — 85025 COMPLETE CBC W/AUTO DIFF WBC: CPT | Performed by: HOSPITALIST

## 2018-10-19 RX ORDER — LEVOFLOXACIN 5 MG/ML
500 INJECTION, SOLUTION INTRAVENOUS EVERY 24 HOURS
Status: DISCONTINUED | OUTPATIENT
Start: 2018-10-19 | End: 2018-10-20 | Stop reason: HOSPADM

## 2018-10-19 RX ORDER — MORPHINE SULFATE 20 MG/ML
10 SOLUTION ORAL
Status: DISCONTINUED | OUTPATIENT
Start: 2018-10-19 | End: 2018-10-20 | Stop reason: HOSPADM

## 2018-10-19 RX ORDER — POTASSIUM CHLORIDE 20 MEQ/1
40 TABLET, EXTENDED RELEASE ORAL
Status: COMPLETED | OUTPATIENT
Start: 2018-10-19 | End: 2018-10-19

## 2018-10-19 RX ORDER — LEVOFLOXACIN 25 MG/ML
500 SOLUTION ORAL DAILY
Qty: 100 ML | Refills: 0 | Status: SHIPPED | OUTPATIENT
Start: 2018-10-19

## 2018-10-19 RX ORDER — LORAZEPAM 2 MG/ML
2 CONCENTRATE ORAL
Status: DISCONTINUED | OUTPATIENT
Start: 2018-10-19 | End: 2018-10-20 | Stop reason: HOSPADM

## 2018-10-19 RX ORDER — LORAZEPAM 2 MG/ML
2 CONCENTRATE ORAL
Qty: 30 ML | Refills: 0 | Status: SHIPPED
Start: 2018-10-19

## 2018-10-19 RX ADMIN — Medication 500 MG: at 09:56

## 2018-10-19 RX ADMIN — Medication 500 MG: at 22:36

## 2018-10-19 RX ADMIN — HYDRALAZINE HYDROCHLORIDE 10 MG: 20 INJECTION INTRAMUSCULAR; INTRAVENOUS at 00:52

## 2018-10-19 RX ADMIN — SODIUM CHLORIDE 50 ML/HR: 900 INJECTION, SOLUTION INTRAVENOUS at 00:56

## 2018-10-19 RX ADMIN — LEVOFLOXACIN 500 MG: 5 INJECTION, SOLUTION INTRAVENOUS at 14:49

## 2018-10-19 RX ADMIN — POTASSIUM CHLORIDE 40 MEQ: 20 TABLET, EXTENDED RELEASE ORAL at 09:56

## 2018-10-19 NOTE — PROGRESS NOTES
Hospitalist Progress Note Patient: Devin Weir MRN: [id number]  CSN: [id number] YOB: 1935  Age: 81 y.o. Sex: female DOA: 10/17/2018 LOS:  LOS: 2 days Assessment/Plan 1. Severe sepsis due to E coli UTI and aspiration pneumonia 2. Dementia 3. HTN 
4. Hypokalemia 5. Protein calorie malnutrition 6. Ovarian mass of unknown diagnosis 7. dysphagia Plan: 
- lengthy discussion w pt son ( POA) regarding Ms Valladares medical condition and goals of care. Discussed continuation of aggressive care versus comfort measures and hospice care. He elects to not aggressively treat mom and focus on symptom management. Will contineu IV fluids, antibiotics while hospitalized and anticiapte DC home with hospice services tomorrow. Patient Active Problem List  
Diagnosis Code  Severe sepsis (HCC) A41.9, R65.20  Alzheimer's dementia G30.9, F02.80  
 HTN (hypertension) I10  
 Hypercholesteremia E78.00  Situational anxiety F41.8  Hypokalemia E87.6  PNA (pneumonia) J18.9  
 UTI (urinary tract infection) N39.0  Pelvic mass R19.00 Subjective:  
 cc: UTI History obtained from son and chart review due to pt significant cognitie impairment No acute events overnight Pt tolerating fluids, abx Son reports her breathing appears more labored this AM. No productive cough noted Premorbid functional status: 
dependent for all ADLs except cueing for feeding and assist w grooming Incontinent bowel/bladder Has ovarian mass of unknown diagnosis which family and treating team will not biopsy due to her advanced cognitive impairment and debility REVIEW OF SYSTEMS: 
Unable to obtain 2/2 cognitive impairment Objective:  
  
 
Vital signs/Intake and Output: 
Visit Vitals /77 (BP 1 Location: Left arm, BP Patient Position: At rest) Pulse 99 Temp 98.2 °F (36.8 °C) Resp 18 Ht 5' 4\" (1.626 m) Wt 73.5 kg (162 lb 0.6 oz) SpO2 100% BMI 27.81 kg/m² Current Shift:  No intake/output data recorded. Last three shifts:  10/17 1901 - 10/19 0700 In: 2222.9 [I.V.:2222.9] Out: 2 Body mass index is 27.81 kg/m². Physical Exam: 
GEN: Alert, confused, pleasant EYES: conjunctiva normal, lids with out lesions HEENT: MMdry, No thyromegaly, no lymphadenopathy HEART: RRR +S1 +S2, no JVD, pulses 2+ distally LUNGS: CTA B/L no wheezes, rales or rhonchi ABDOMEN: + BS, soft NT/ND no organomegaly,  No rebound EXTREMITIES: No edema cyanosis, cap refill normal, scattered ecchymosis noted SKIN: no rashes or skin breakdown, no nodules, normal turgor, ecchymosis Current Facility-Administered Medications Medication Dose Route Frequency  levoFLOXacin (LEVAQUIN) 500 mg in D5W IVPB  500 mg IntraVENous Q24H  potassium chloride (K-DUR, KLOR-CON) SR tablet 40 mEq  40 mEq Oral NOW  morphine (ROXANOL) 100 mg/5 mL (20 mg/mL) concentrated solution 10 mg  10 mg Oral Q2H PRN  
 LORazepam (INTENSOL) 2 mg/mL oral concentrate 2 mg  2 mg SubLINGual Q4H PRN  
 clonazePAM (KlonoPIN) tablet 0.25 mg  0.25 mg Oral QHS PRN  
 sodium chloride (NS) flush 5-10 mL  5-10 mL IntraVENous PRN  
 acetaminophen (TYLENOL) tablet 650 mg  650 mg Oral Q4H PRN  
 naloxone (NARCAN) injection 0.4 mg  0.4 mg IntraVENous PRN  
 diphenhydrAMINE (BENADRYL) injection 12.5 mg  12.5 mg IntraVENous Q4H PRN  
 ondansetron (ZOFRAN) injection 4 mg  4 mg IntraVENous Q4H PRN  
 heparin (porcine) injection 5,000 Units  5,000 Units SubCUTAneous Q8H  
 ELECTROLYTE REPLACEMENT PROTOCOL - Potassium  1 Each Other PRN  
 ELECTROLYTE REPLACEMENT PROTOCOL - Magnesium  1 Each Other PRN  
 Saccharomyces boulardii (FLORASTOR) capsule 500 mg  500 mg Oral BID  hydrALAZINE (APRESOLINE) 20 mg/mL injection 10 mg  10 mg IntraVENous Q6H PRN All the patient's labs over the past 24 hours were reviewed both during my initial daily workflow process and at the time notated as \"note time\" in [facility] Care. (It is not time stamped separately in this workflow.)  Select labs are listed below. Labs: Results:  
   
Chemistry Recent Labs 10/19/18 
0408 10/18/18 
2145 10/18/18 
1323 10/18/18 
0425  10/17/18 
1250 GLU 98  --   --  103*  --  156*   --   --  140  --  139  
K 3.1* 3.4* 3.8 3.4*   < > 3.4*  
  --   --  109*  --  101 CO2 20*  --   --  20*  --  26 BUN 10  --   --  11  --  18  
CREA 0.67  --   --  0.67  --  1.07  
CA 8.9  --   --  8.6  --  9.7 AGAP 14  --   --  11  --  12  
BUCR 15  --   --  16  --  17  
AP  --   --   --   --   --  91  
TP  --   --   --   --   --  7.8 ALB  --   --   --   --   --  3.3*  
GLOB  --   --   --   --   --  4.5* AGRAT  --   --   --   --   --  0.7*  
 < > = values in this interval not displayed. CBC w/Diff Recent Labs 10/19/18 
0408 10/18/18 
0425 10/17/18 
1250 WBC 8.9 11.8 15.8*  
RBC 4.42 4.38 4.88  
HGB 12.5 12.4 14.0  
HCT 37.4 37.3 42.0  143 151 GRANS 83* 82* 85* LYMPH 8* 10* 7* EOS 1 0 0 Liver Enzymes Recent Labs 10/17/18 
1250 TP 7.8 ALB 3.3* AP 91 SGOT 20 Procedures/imaging: see electronic medical records for all procedures/Xrays and details which were not copied into this note but were reviewed prior to creation of Plan Time spent discussing advance care plannin minutes aside exam 
 
 
 
 
 
Omega Manifold, DO Internal Medicine/Geriatrics

## 2018-10-19 NOTE — PROGRESS NOTES
Problem: Mobility Impaired (Adult and Pediatric) Goal: *Acute Goals and Plan of Care (Insert Text) Physical Therapy Goals Initiated 10/19/2018 and to be accomplished within 3-7 day(s) 1. Patient will move from supine to sit and sit to supine  in bed with minimal assistance/contact guard assist.    
2.  Patient will transfer from bed to chair and chair to bed with minimal assistance/contact guard assist using the least restrictive device. 3.  Patient will perform sit to stand with minimal assistance/contact guard assist. 
4.  Patient will ambulate with minimal assistance/contact guard assist for 50 feet with the least restrictive device. 5.  Patient will ascend/descend 3 stairs with handrail(s) with moderate assistance . physical Therapy TREATMENT Patient: Keely Lewis (41 y.o. female) Date: 10/19/2018 Diagnosis: Sepsis ([facility] [address] Severe sepsis ([facility] [address] Precautions: Fall, Skin, Aspiration Chart, physical therapy assessment, plan of care and goals were reviewed. ASSESSMENT: 
Pt demonstrated good progress with amb today. Increased ambulation distance today with B HHA. Family in room and reporting this is almost baseline. Will continue to progress as pt tolerates. Progression toward goals: 
[]      Improving appropriately and progressing toward goals [x]      Improving slowly and progressing toward goals 
[]      Not making progress toward goals and plan of care will be adjusted PLAN: 
Patient continues to benefit from skilled intervention to address the above impairments. Continue treatment per established plan of care. Discharge Recommendations:  Home Health Further Equipment Recommendations for Discharge:  N/A  
 
SUBJECTIVE:  
Patient stated I'm good.  OBJECTIVE DATA SUMMARY:  
Critical Behavior: 
Neurologic State: Alert, Confused Orientation Level: Disoriented to place, Disoriented to situation, Disoriented to time, Oriented to person Cognition: Decreased attention/concentration Safety/Judgement: Decreased awareness of need for assistance, Decreased awareness of need for safety, Decreased insight into deficits, Decreased awareness of environment Functional Mobility Training: 
Bed Mobility: 
Supine to Sit: Moderate assistance Sit to Supine: Moderate assistance Transfers: 
Sit to Stand: Minimum assistance Stand to Sit: Minimum assistance Balance: 
Sitting: Impaired;High guard; With support Sitting - Static: Fair (occasional) Sitting - Dynamic: Fair (occasional) Standing: Impaired; With support Standing - Static: Poor Standing - Dynamic : PoorAmbulation/Gait Training: 
Distance (ft): 30 Feet (ft) Assistive Device: Gait belt Ambulation - Level of Assistance: Minimal assistance Gait Abnormalities: Decreased step clearance; Path deviations Base of Support: Narrowed Speed/Ila: Slow Pain: 
Pain Scale 1: Numeric (0 - 10) Pain Intensity 1: 0 Activity Tolerance:  
Fair Please refer to the flowsheet for vital signs taken during this treatment. After treatment:  
[] Patient left in no apparent distress sitting up in chair 
[x] Patient left in no apparent distress in bed 
[x] Call bell left within reach [x] Nursing notified 
[] Caregiver present 
[] Bed alarm activated Ritta Min Time Calculation: 19 mins

## 2018-10-19 NOTE — PROGRESS NOTES
This author, RN Lawson Alonso, and Rigoberto Leonard met with Pt and Pt's son, Paco Portillo, at bedside for 30 min. Pt was awake intermittently and spoke briefly- lots of echolalia, per Paco Portillo. Pt may be slated for DC home with Paco Portillo and his spouse tomorrow. Discussed hospice referral and provided info on services included with hospice, and Paco Portillo is interested in speaking w Hospice staff. The family has some additional support through [employer], and her  whom she still recognizes visited yesterday. He thinks that he and spouse are equipped to manage Pt's care at home. Paco Portillo has an older brother who is also here on the [facility] and plans to visit, but he's unable to assist on a regular basis. No additional concerns at this time. Thank you for the consult and the opportunity to care for Ms. Denton Lan, [facility]

## 2018-10-19 NOTE — PROGRESS NOTES
Bedside and Verbal shift change report given to Zak RN (oncoming nurse) by Tucker Torres RN (offgoing nurse). Report included the following information SBAR, Kardex, Intake/Output and MAR.

## 2018-10-19 NOTE — PROGRESS NOTES
Problem: Dysphagia (Adult) Goal: *Acute Goals and Plan of Care (Insert Text) Recommendations: 
Diet: Regular/thin Meds: Per patient preference Aspiration Precautions Oral Care TID Goals:  Patient will: 1. Tolerate PO trials with 0 s/s overt distress in 4/5 trials - goal met 2. Utilize compensatory swallow strategies/maneuvers (decrease bite/sip, size/rate, alt. liq/sol) with min cues in 4/5 trials - goal met 3. Perform oral-motor/laryngeal exercises to increase oropharyngeal swallow function with min cues - goal N/A 
4. Complete an objective swallow study (i.e., MBSS) to assess swallow integrity, r/o aspiration, and determine of safest LRD, min A (met 10/18/18). Outcome: Resolved/Met Date Met: 10/19/18 Speech language pathology dysphagia treatment & discharge Patient: Deann Leyden (66 y.o. female) Date: 10/19/2018 Diagnosis: Sepsis ([address] Severe sepsis ([address] Precautions: Fall, Skin, Aspiration PLOF: Living with family ASSESSMENT: 
Dysphagia follow up completed this day. Pt A&O to self only. Family at bedside reporting good tolerance of previous meals. MBS completed 10/18 without aspiration, recommendation for regular/thin liquid diet. Results and recs reviewed with family, verbalized comprehension. Pt observed self-feeding thin liquid + straw, puree and regular solid trials. Pt exhibited adequate bolus cohesion, manipulation and A-P transit. Further exhibited adequate swallow timing/reflex and hyolaryngeal excursion. Able to manipulate and clear with 0 clinical s/s aspiration. Pt safe for regular solid, thin liquid diet, supervision with all meals. Maximum therapeutic gains met; safest, least restrictive diet achieved in current in-patient/acute setting. Accordingly, SLP to d/c intervention at this time. Progression toward goals: 
[x]         Improving appropriately - goals met/approximated 
[]         Not making progress/Not appropriate - will d/c POC PLAN: 
 Recommendations and Planned Interventions: 
Maximum therapeutic gains met; safest, least restrictive diet achieved. D/C ST intervention at this time. Discharge Recommendations:  None SUBJECTIVE:  
Patient stated Sounds good to me. OBJECTIVE:  
Cognitive and Communication Status: 
Neurologic State: Alert, Confused Orientation Level: Disoriented to place, Disoriented to situation, Disoriented to time, Oriented to person Cognition: Decreased attention/concentration Perception: Tactile, Verbal, Visual 
Perseveration: Tactile cues provided, Verbal cues provided, Visual cues provided Safety/Judgement: Decreased awareness of need for assistance, Decreased awareness of need for safety, Decreased insight into deficits, Decreased awareness of environment Dysphagia Treatment: 
Oral Assessment: 
Oral Assessment Labial: No impairment Dentition: Intact, Limited Oral Hygiene: (Good) Lingual: No impairment Velum: No impairment Mandible: No impairment Gag Reflex: Other (comment)(Not tested) P.O. Trials: 
 Patient Position: TLB 96 Vocal quality prior to P.O.: Wet Consistency Presented: Thin liquid, Solid, Puree How Presented: Successive swallows, Cup/sip, Self-fed/presented Bolus Acceptance: No impairment Bolus Formation/Control: No impairment Propulsion: No impairment Oral Residue: None Initiation of Swallow: No impairment Laryngeal Elevation: Functional 
 Aspiration Signs/Symptoms: Infiltrate on chest xray, Weak cough Pharyngeal Phase Characteristics: Poor endurance Effective Modifications: Small sips and bites Cues for Modifications: Minimal 
   
 Oral Phase Severity: Minimal 
 Pharyngeal Phase Severity : Minimal 
 PAIN: 
Start of Tx: 0 End of Tx: 0  
 
GCODESwallowing:  Swallow Current Status CH= 0% 
 Swallow Goal Status CH= 0% 
 Swallow D/C Status CH= 0% The severity rating is based on the following outcomes: NICOLLE Noms Swallow Level 7 Clinical Judgement After treatment:  
[]              Patient left in no apparent distress sitting up in chair 
[x]              Patient left in no apparent distress in bed 
[x]              Call bell left within reach 
[]              Nursing notified 
[x]              Caregiver present 
[]              Bed alarm activated COMMUNICATION/EDUCATION:  
[x] Safe swallowing guidelines; compensatory techniques [x]        Patient unable to participate in education; education ongoing with staff 
[]  Posted safety precautions in patient's room. [] Oral-motor/laryngeal strengthening exercises AURELIA Rodriguez Time Calculation: 10 mins

## 2018-10-19 NOTE — HOSPICE
Hospice referral noted. Chart review in process. [employer] to follow up with family to discuss hospice services and philosophy. 1138-Family meeting at bedside with Shravan Ribeiro and spouse. Reviewed hospice philosophy, services and benefits. Family agreeable to hospice services. Will contact case management regarding discharge planning. RADHA Lockhart [phone] office/24hr 
[phone] wk cell

## 2018-10-19 NOTE — PROGRESS NOTES
DC Plan:  Discharge home tomorrow with [facility], Please include all hard scripts for controlled substances, med rec and dc summary in packet. Please medicate for pain prior to dc if possible and needed to help offset delay Chart reviewed. Hospice order noted. Met with pt and pts sonEkta at bedside. Pt did not participate in conversation. Pt will dc home with son with hospice tomorrow. Pt has been referred to [facility] by MD.  Referral placed by Marita Crum. Pts home address confirmed per face sheet. All questions addressed. CM will cont to follow. [address] Spoke with [facility] and she will have someone contact son within the next hour. Contact information for son provided to [facility] 
 
[phone] [facility] will have DME delivered tomorrow morning Reason for Admission:   sepsis RRAT Score:   13 mod Do you (patient/family) have any concerns for transition/discharge? Plan for utilizing home health:     Home hospice Likelihood of readmission?   high Transition of Care Plan:      Home with [facility] hospice tomorrow Care Management Interventions Mode of Transport at Discharge: BLS Transition of Care Consult (CM Consult): Home Hospice(medi) [facility]: No 
Reason Outside [place]: Physician referred to specific agency(medi) Current Support Network: Relative's Home(lives w son) Plan discussed with Pt/Family/Caregiver: Yes Freedom of Choice Offered: Yes Discharge Location Discharge Placement: Home with hospice

## 2018-10-19 NOTE — PROGRESS NOTES
Problem: Mobility Impaired (Adult and Pediatric) Goal: *Acute Goals and Plan of Care (Insert Text) Physical Therapy Goals Initiated 10/19/2018 and to be accomplished within 3-7 day(s) 1. Patient will move from supine to sit and sit to supine  in bed with minimal assistance/contact guard assist.    
2.  Patient will transfer from bed to chair and chair to bed with minimal assistance/contact guard assist using the least restrictive device. 3.  Patient will perform sit to stand with minimal assistance/contact guard assist. 
4.  Patient will ambulate with minimal assistance/contact guard assist for 50 feet with the least restrictive device. 5.  Patient will ascend/descend 3 stairs with handrail(s) with moderate assistance . Outcome: Progressing Towards Goal 
physical Therapy EVALUATION Patient: Damon Ledesma (49 y.o. female) Date: 10/18/18 Primary Diagnosis: Sepsis ([facility] [address] Precautions:   Fall, Skin, Aspiration ASSESSMENT : 
Based on the objective data described below, the patient presents with lower extremity weakness, decreased gait quality and endurance, impaired bed mobility and transfers, and overall limitations in functional mobility. Pt lives with family who assist with all mobility, hand held assist with ambulation. Pt performed sit to stand with Min-ModA. Patient ambulated 5 feet with B HHA, GB applied, ModA. Pt tolerated session well as evidenced by no c/o increased pain, no lightheadedness. Patient would benefit from skilled inpatient physical therapy to address deficits, progress as tolerated to achieve long term goals and allow safe discharge. Patient will benefit from skilled intervention to address the above impairments. Patients rehabilitation potential is considered to be Good Factors which may influence rehabilitation potential include:  
[]         None noted 
[x]         Mental ability/status [x]         Medical condition 
[]         Home/family situation and support systems [x]         Safety awareness [x]         Pain tolerance/management 
[]         Other: PLAN : 
Recommendations and Planned Interventions: 
[x]           Bed Mobility Training             [x]    Neuromuscular Re-Education 
[x]           Transfer Training                   []    Orthotic/Prosthetic Training 
[x]           Gait Training                          []    Modalities [x]           Therapeutic Exercises          []    Edema Management/Control 
[x]           Therapeutic Activities            [x]    Patient and Family Training/Education 
[]           Other (comment): Frequency/Duration: Patient will be followed by physical therapy 1-2 times per day to address goals. Discharge Recommendations: Home Health Further Equipment Recommendations for Discharge: N/A  
 
SUBJECTIVE:  
Patient stated I am doing ok.  OBJECTIVE DATA SUMMARY:  
 
Past Medical History:  
Diagnosis Date  Alzheimer's dementia PCP stopped all meds 7/2018  
 HTN (hypertension) PCP stopped all meds 7/2018  Hypercholesteremia PCP stopped all meds 7/2018  Situational anxiety Past Surgical History:  
Procedure Laterality Date  HX HYSTERECTOMY Age 55 Barriers to Learning/Limitations: None Compensate with: Visual Cues, Verbal Cues and Tactile Cues Prior Level of Function/Home Situation: Assisted amb c/HHA Home Situation Home Environment: Private residence # Steps to Enter: 4 One/Two Story Residence: Two story, live on 1st floor Living Alone: No 
Support Systems: Family member(s), Home care staff Patient Expects to be Discharged to[de-identified] Private residence Current DME Used/Available at Home: None, Transfer bench, Walker(doesn't use AD for mobility; 2 person on each side) Tub or Shower Type: Tub/Shower combinationStrength:   
Grossly impaired, functionalRange Of Motion: 
Grossly impaired, functional 
Functional Mobility: 
Bed Mobility: 
Supine to/from sit with ModATransfers: Stand to/from sit with [address] Ambulation/Gait Training: 
 Amb 5 ft c/HHA, ModA Pain: 
Pain Scale 1: Numeric (0 - 10) Pain Intensity 1: 0 Activity Tolerance:  
Good Please refer to the flowsheet for vital signs taken during this treatment. After treatment:  
[]         Patient left in no apparent distress sitting up in chair 
[x]         Patient left in no apparent distress in bed 
[x]         Call bell left within reach [x]         Nursing notified 
[]         Caregiver present 
[]         Bed alarm activated COMMUNICATION/EDUCATION:  
[x]         Fall prevention education was provided and the patient/caregiver indicated understanding. [x]         Patient/family have participated as able in goal setting and plan of care. [x]         Patient/family agree to work toward stated goals and plan of care. []         Patient understands intent and goals of therapy, but is neutral about his/her participation. []         Patient is unable to participate in goal setting and plan of care. Thank you for this referral. 
Alvarez Turner Complexity: History: MEDIUM  Complexity : 1-2 comorbidities / personal factors will impact the outcome/ POC Exam:MEDIUM Complexity : 3 Standardized tests and measures addressing body structure, function, activity limitation and / or participation in recreation  Presentation: MEDIUM Complexity : Evolving with changing characteristics  Clinical Decision Making:Medium Complexity amb <30 ft c/RW, ModA for mob Overall Complexity:MEDIUM Mobility  Current  CL= 60-79%   Goal  CJ= 20-39%. The severity rating is based on the Level of Assistance required for Functional Mobility and ADLs.

## 2018-10-20 VITALS
OXYGEN SATURATION: 97 % | HEIGHT: 64 IN | RESPIRATION RATE: 18 BRPM | WEIGHT: 160.05 LBS | HEART RATE: 88 BPM | DIASTOLIC BLOOD PRESSURE: 72 MMHG | TEMPERATURE: 98.6 F | SYSTOLIC BLOOD PRESSURE: 152 MMHG | BODY MASS INDEX: 27.33 KG/M2

## 2018-10-20 LAB
ANION GAP SERPL CALC-SCNC: 9 MMOL/L (ref 3–18)
BASOPHILS # BLD: 0 K/UL (ref 0–0.1)
BASOPHILS NFR BLD: 0 % (ref 0–2)
BUN SERPL-MCNC: 10 MG/DL (ref 7–18)
BUN/CREAT SERPL: 13 (ref 12–20)
CALCIUM SERPL-MCNC: 8.8 MG/DL (ref 8.5–10.1)
CHLORIDE SERPL-SCNC: 107 MMOL/L (ref 100–108)
CO2 SERPL-SCNC: 24 MMOL/L (ref 21–32)
CREAT SERPL-MCNC: 0.79 MG/DL (ref 0.6–1.3)
DIFFERENTIAL METHOD BLD: ABNORMAL
EOSINOPHIL # BLD: 0.3 K/UL (ref 0–0.4)
EOSINOPHIL NFR BLD: 4 % (ref 0–5)
ERYTHROCYTE [DISTWIDTH] IN BLOOD BY AUTOMATED COUNT: 13.8 % (ref 11.6–14.5)
GLUCOSE SERPL-MCNC: 107 MG/DL (ref 74–99)
HCT VFR BLD AUTO: 36.4 % (ref 35–45)
HGB BLD-MCNC: 12 G/DL (ref 12–16)
LYMPHOCYTES # BLD: 1.1 K/UL (ref 0.9–3.6)
LYMPHOCYTES NFR BLD: 16 % (ref 21–52)
MCH RBC QN AUTO: 28.1 PG (ref 24–34)
MCHC RBC AUTO-ENTMCNC: 33 G/DL (ref 31–37)
MCV RBC AUTO: 85.2 FL (ref 74–97)
MONOCYTES # BLD: 0.7 K/UL (ref 0.05–1.2)
MONOCYTES NFR BLD: 10 % (ref 3–10)
NEUTS SEG # BLD: 4.8 K/UL (ref 1.8–8)
NEUTS SEG NFR BLD: 70 % (ref 40–73)
PLATELET # BLD AUTO: 159 K/UL (ref 135–420)
PMV BLD AUTO: 10.3 FL (ref 9.2–11.8)
POTASSIUM SERPL-SCNC: 3.4 MMOL/L (ref 3.5–5.5)
RBC # BLD AUTO: 4.27 M/UL (ref 4.2–5.3)
SODIUM SERPL-SCNC: 140 MMOL/L (ref 136–145)
WBC # BLD AUTO: 6.8 K/UL (ref 4.6–13.2)

## 2018-10-20 PROCEDURE — 80048 BASIC METABOLIC PNL TOTAL CA: CPT | Performed by: HOSPITALIST

## 2018-10-20 PROCEDURE — 36415 COLL VENOUS BLD VENIPUNCTURE: CPT | Performed by: HOSPITALIST

## 2018-10-20 PROCEDURE — 74011250637 HC RX REV CODE- 250/637: Performed by: HOSPITALIST

## 2018-10-20 PROCEDURE — 85025 COMPLETE CBC W/AUTO DIFF WBC: CPT | Performed by: HOSPITALIST

## 2018-10-20 RX ORDER — LORAZEPAM 2 MG/ML
1 CONCENTRATE ORAL
Qty: 30 ML | Refills: 0 | Status: SHIPPED | OUTPATIENT
Start: 2018-10-20

## 2018-10-20 RX ORDER — MORPHINE SULFATE 20 MG/5ML
5 SOLUTION ORAL
Qty: 30 ML | Refills: 0 | Status: SHIPPED | OUTPATIENT
Start: 2018-10-20

## 2018-10-20 RX ADMIN — Medication 500 MG: at 11:05

## 2018-10-20 NOTE — DISCHARGE SUMMARY
Discharge Summary  Subjective:       Admit Date: 10/17/2018  Discharge Date:  10/20/2018, 8:39 AM    Discharging Physician: Charlotte Paula pager [phone]  Primary Care Provider:  Justin Corcoran MD    Patient ID:  Carlos Foote  81 y.o. female  1935    Reason for Admission:  Sepsis [facility])    Discharge Diagnosis:   1. Severe sepsis due to E coli UTI and aspiration pneumonia  2. Dementia  3. HTN  4. Hypokalemia  5. Protein calorie malnutrition  6. Ovarian mass of unknown diagnosis  7. dysphagia      Patient Active Problem List   Diagnosis Code    Severe sepsis (HCC) A41.9, R65.20    Alzheimer's dementia G30.9, F02.80    HTN (hypertension) I10    Hypercholesteremia E78.00    Situational anxiety F41.8    Hypokalemia E87.6    PNA (pneumonia) J18.9    UTI (urinary tract infection) N39.0    Pelvic mass R19.00       Consultants:    pulmonology    Hospital Course:   Reason for admission ( Admitting HPI): \" Carlos Foote is a 81 y.o. female who hx of dementia, htn , hld ,ovarian cyst was sent to ER per family due to ams. She has dementia, son reported she has decreased appetite, fever and more confused. [facility] suspected [facility]i [facility] and was told to come to hospital for possible sepsis. She presented fever on arrival, lactic acid 4, wbc 15.8. Cxr: rt side pneumonia. She was in SNF two weeks ago. She was not a good historian and all above information came from family and er report. Son and daughter in law were at the bedside. \"    She was admitted to the hospitalist service requiring ICU level of care. She was startred on broad spectrum antibiotics which she tolerated. She improved clinically. Goals of care was discussed on 10/19 and family elected to proceed w DC home w hospice services. She is currently comfortable.      Pertinent Imaging/ Operative procedures:     CXR: \"Mild, unchanged asymmetric right basilar opacity which may reflect underlying   atelectasis and/or airspace disease. \"      Pertinent Results:    BMP:   Lab Results   Component Value Date/Time     10/20/2018 05:30 AM    K 3.4 (L) 10/20/2018 05:30 AM     10/20/2018 05:30 AM    CO2 24 10/20/2018 05:30 AM    AGAP 9 10/20/2018 05:30 AM     (H) 10/20/2018 05:30 AM    BUN 10 10/20/2018 05:30 AM    CREA 0.79 10/20/2018 05:30 AM    GFRAA >60 10/20/2018 05:30 AM    GFRNA >60 10/20/2018 05:30 AM     CBC:   Lab Results   Component Value Date/Time    WBC 6.8 10/20/2018 05:30 AM    HGB 12.0 10/20/2018 05:30 AM    HCT 36.4 10/20/2018 05:30 AM     10/20/2018 05:30 AM         Physical Exam on Discharge:  Visit Vitals  /81 (BP 1 Location: Right arm, BP Patient Position: At rest;Supine)   Pulse 94   Temp 98.8 °F (37.1 °C)   Resp 18   Ht 5' 4\" (1.626 m)   Wt 72.6 kg (160 lb 0.9 oz)   SpO2 93%   BMI 27.47 kg/m²     Body mass index is 27.47 kg/m². GEN: chronicall ill appearing  HEART:S1 S2  NEURO: nonfocal   Condition at discharge: stable    Discharge Medications  Current Discharge Medication List      START taking these medications    Details   LORazepam (INTENSOL) 2 mg/mL concentrated solution Take 1 mL by mouth every four (4) hours as needed for Anxiety. Max Daily Amount: 12 mg.  Qty: 30 mL, Refills: 0    Associated Diagnoses: Late onset Alzheimer's disease with behavioral disturbance      levoFLOXacin (LEVAQUIN) 250 mg/10 mL solution Take 20 mL by mouth daily. Qty: 100 mL, Refills: 0         CONTINUE these medications which have NOT CHANGED    Details   LORazepam (ATIVAN) 0.5 mg tablet Take 0.5 mg by mouth daily as needed for Anxiety (situational anxiety). cranberry extract 450 mg tab tablet Take 450 mg by mouth as needed (uti sx). acetaminophen (TYLENOL) 325 mg tablet Take 650 mg by mouth every six (6) hours as needed for Pain.              Disposition: home w hospice   Follow up:  na  Restrictions: none    Diagnostic Imaging/Labs pending at discharge: none      Dauna Denver, [address] [facility]  Internal Medicine/Geriatrics    Time Spent 40 minutes with >50% coordination of care          CC: Sanya Oreilly MD

## 2018-10-20 NOTE — PROGRESS NOTES
8731 - Received report from Yousif Sanchez RN. Assumed care of patient at this time. 0830 - Shift assessment complete. Please refer to flowsheet. 1100 - Nurse from [address] [place] [address] in to speak with family. 65 - Representative from [facility] into speak with family. 1200 - Informed  that patient's family would like to speak with her.

## 2018-10-20 NOTE — PROGRESS NOTES
Spoke with [facility] [phone] nurse Carmen Garcia informed cm that hospital bed will be delivered at 930 am this morning and would like to be notified of medical transport time so she can meet pt in the home, cm spoke with bedside nurse Sj Bush update provided will notify cm of transport time,cm also contacted son while visiting his mother in room stated someone will be in the home to accept equipment.

## 2018-10-20 NOTE — PROGRESS NOTES
Page received from bedside nurse regarding medical transport time, instructed nurse to call intake [facility] nurse  Jodietiffanylurdes Kely [phone] so she can meet pt in home,cm also informed nurse that Clarice Arnett is ordering physician for d/c pain medication hard scripts should be given to son to fill.

## 2018-10-20 NOTE — ROUTINE PROCESS
Bedside and Verbal shift change report given to Srinath Bridges RN (oncoming nurse) by Sagar Moreno RN (offgoing nurse). Report included the following information SBAR, Kardex, ED Summary, Procedure Summary, Intake/Output, MAR, Recent Results and Med Rec Status.

## 2018-10-21 NOTE — PROGRESS NOTES
5712Landy Mckinley. CM called and said that pt need medical transport to go home and contact her when medical transport is set up and she would contact the hospice nurse to wait for pt at home. 1103: Assessment completed. Patient is A&O x1, disoriented to place, situation and time. Patient is calm and cooperative. Family at bedside. Patient's oral mucosa pink and moist, strong  on both upper extremities. Lung sound clear, not appear distress. Bowel sounds active. Pedal pulses are palpable, no complain of any numbness or tingling. Pain is 0 per FLACC scale. bed is in lower position, call bell and personal items are within reach. Discharge dual review with [facility]. Alix Gregorio. Discharge instruction is reviewed with pt and family. Allowed time to ask question, notified son medical transport will be here at 11:30. Page Dr Rochelle Durán at this time about the prescription of Ativan and pain med. 1110: Dr Frank called back and said that he would come as soon as he can. 1130: Ativan, morphine and Levaquin medication is given to son. Medical transport in here and ready to transported pt back home.

## 2018-10-23 LAB
BACTERIA SPEC CULT: NORMAL
BACTERIA SPEC CULT: NORMAL
SERVICE CMNT-IMP: NORMAL
SERVICE CMNT-IMP: NORMAL

## 2018-11-25 LAB
ATRIAL RATE: 95 BPM
CALCULATED P AXIS, ECG09: 70 DEGREES
CALCULATED R AXIS, ECG10: 4 DEGREES
CALCULATED T AXIS, ECG11: 13 DEGREES
DIAGNOSIS, 93000: NORMAL
P-R INTERVAL, ECG05: 156 MS
Q-T INTERVAL, ECG07: 350 MS
QRS DURATION, ECG06: 110 MS
QTC CALCULATION (BEZET), ECG08: 439 MS
VENTRICULAR RATE, ECG03: 95 BPM

## 2020-12-07 NOTE — ROUTINE PROCESS
Bedside and Verbal shift change report given to Mary Wilson RN (oncoming nurse) by Mati Ramos RN (offgoing nurse).  Report included the following information SBAR, Kardex, Intake/Output, MAR, Recent Results, Med Rec Status and Cardiac Rhythm SR.  
 
 07-Dec-2020 16:46 07-Dec-2020 19:10

## 2024-04-15 NOTE — PROGRESS NOTES
Problem: Falls - Risk of 
Goal: *Absence of Falls Document Rubi Lee Fall Risk and appropriate interventions in the flowsheet. Outcome: Progressing Towards Goal 
Fall Risk Interventions: 
  
 
Mentation Interventions: Adequate sleep, hydration, pain control, Door open when patient unattended, Bed/chair exit alarm, Family/sitter at bedside, Reorient patient Medication Interventions: Evaluate medications/consider consulting pharmacy Elimination Interventions: Call light in reach, Bed/chair exit alarm, Patient to call for help with toileting needs, Toileting schedule/hourly rounds Problem: Patient Education: Go to Patient Education Activity Goal: Patient/Family Education Outcome: Progressing Towards Goal 
Bed alarm on for safety, education on use of call bell for assistance, no falls noted at this time. Problem: Pressure Injury - Risk of 
Goal: *Prevention of pressure injury Document Dontrell Scale and appropriate interventions in the flowsheet. Outcome: Progressing Towards Goal 
Pressure Injury Interventions: 
Sensory Interventions: Assess changes in LOC, Keep linens dry and wrinkle-free, Maintain/enhance activity level, Minimize linen layers, Turn and reposition approx. every two hours (pillows and wedges if needed) Moisture Interventions: Absorbent underpads, Check for incontinence Q2 hours and as needed, Maintain skin hydration (lotion/cream), Minimize layers Activity Interventions: Pressure redistribution bed/mattress(bed type) Mobility Interventions: Pressure redistribution bed/mattress (bed type), PT/OT evaluation, Turn and reposition approx. every two hours(pillow and wedges) Nutrition Interventions: Document food/fluid/supplement intake Friction and Shear Interventions: Lift team/patient mobility team, Minimize layers, Apply protective barrier, creams and emollients Problem: Patient Education: Go to Patient Education Activity Goal: Patient/Family Education Outcome: Progressing Towards Goal 
Education given regarding repositioning for prevention of pressure injury. Problem: Pneumonia: Discharge Outcomes Goal: *Demonstrates progressive activity Incentive spirometer initiated with education/ instruction. alert and awake